# Patient Record
Sex: FEMALE | Race: WHITE | Employment: OTHER | URBAN - METROPOLITAN AREA
[De-identification: names, ages, dates, MRNs, and addresses within clinical notes are randomized per-mention and may not be internally consistent; named-entity substitution may affect disease eponyms.]

---

## 2019-02-19 ENCOUNTER — OFFICE VISIT (OUTPATIENT)
Dept: PRIMARY CARE CLINIC | Age: 64
End: 2019-02-19
Payer: MEDICARE

## 2019-02-19 VITALS
SYSTOLIC BLOOD PRESSURE: 138 MMHG | HEIGHT: 65 IN | HEART RATE: 65 BPM | DIASTOLIC BLOOD PRESSURE: 88 MMHG | TEMPERATURE: 97.7 F | OXYGEN SATURATION: 97 % | BODY MASS INDEX: 38.15 KG/M2 | RESPIRATION RATE: 14 BRPM | WEIGHT: 229 LBS

## 2019-02-19 DIAGNOSIS — M79.604 LEG PAIN, RIGHT: ICD-10-CM

## 2019-02-19 DIAGNOSIS — K27.9 PEPTIC ULCER: Primary | ICD-10-CM

## 2019-02-19 DIAGNOSIS — I26.99 RECURRENT PULMONARY EMBOLI (HCC): ICD-10-CM

## 2019-02-19 PROCEDURE — 1036F TOBACCO NON-USER: CPT | Performed by: INTERNAL MEDICINE

## 2019-02-19 PROCEDURE — G8417 CALC BMI ABV UP PARAM F/U: HCPCS | Performed by: INTERNAL MEDICINE

## 2019-02-19 PROCEDURE — 99203 OFFICE O/P NEW LOW 30 MIN: CPT | Performed by: INTERNAL MEDICINE

## 2019-02-19 PROCEDURE — G8484 FLU IMMUNIZE NO ADMIN: HCPCS | Performed by: INTERNAL MEDICINE

## 2019-02-19 PROCEDURE — G8427 DOCREV CUR MEDS BY ELIG CLIN: HCPCS | Performed by: INTERNAL MEDICINE

## 2019-02-19 PROCEDURE — 3017F COLORECTAL CA SCREEN DOC REV: CPT | Performed by: INTERNAL MEDICINE

## 2019-02-19 RX ORDER — QUINIDINE GLUCONATE 324 MG
TABLET, EXTENDED RELEASE ORAL
COMMUNITY
Start: 2018-12-12 | End: 2019-03-04 | Stop reason: SDUPTHER

## 2019-02-19 RX ORDER — WARFARIN SODIUM 10 MG/1
10 TABLET ORAL
COMMUNITY
Start: 2019-01-10 | End: 2020-03-24 | Stop reason: SDUPTHER

## 2019-02-19 RX ORDER — HYDROCODONE BITARTRATE AND ACETAMINOPHEN 5; 325 MG/1; MG/1
TABLET ORAL
COMMUNITY
Start: 2019-02-04 | End: 2019-02-19 | Stop reason: SDUPTHER

## 2019-02-19 RX ORDER — HYDROCODONE BITARTRATE AND ACETAMINOPHEN 5; 325 MG/1; MG/1
1 TABLET ORAL 3 TIMES DAILY PRN
Qty: 90 TABLET | Refills: 0 | Status: SHIPPED | OUTPATIENT
Start: 2019-02-19 | End: 2019-03-21

## 2019-02-19 RX ORDER — OMEPRAZOLE 40 MG/1
40 CAPSULE, DELAYED RELEASE ORAL DAILY
Qty: 30 CAPSULE | Refills: 11 | Status: SHIPPED | OUTPATIENT
Start: 2019-02-19 | End: 2020-01-28 | Stop reason: SDUPTHER

## 2019-02-19 RX ORDER — OMEPRAZOLE 20 MG/1
20 CAPSULE, DELAYED RELEASE ORAL DAILY
COMMUNITY
End: 2019-02-19 | Stop reason: SDUPTHER

## 2019-02-19 RX ORDER — METOPROLOL SUCCINATE 50 MG/1
50 TABLET, EXTENDED RELEASE ORAL
COMMUNITY
Start: 2019-01-07 | End: 2020-03-24 | Stop reason: SDUPTHER

## 2019-02-19 RX ORDER — PANTOPRAZOLE SODIUM 40 MG/1
TABLET, DELAYED RELEASE ORAL
COMMUNITY
Start: 2018-12-02 | End: 2019-10-01

## 2019-02-19 RX ORDER — SPIRONOLACTONE 25 MG/1
TABLET ORAL
Refills: 0 | COMMUNITY
Start: 2019-01-30 | End: 2019-03-05 | Stop reason: SDUPTHER

## 2019-02-19 ASSESSMENT — PATIENT HEALTH QUESTIONNAIRE - PHQ9
2. FEELING DOWN, DEPRESSED OR HOPELESS: 0
1. LITTLE INTEREST OR PLEASURE IN DOING THINGS: 0
SUM OF ALL RESPONSES TO PHQ9 QUESTIONS 1 & 2: 0
SUM OF ALL RESPONSES TO PHQ QUESTIONS 1-9: 0
SUM OF ALL RESPONSES TO PHQ QUESTIONS 1-9: 0

## 2019-02-24 ASSESSMENT — ENCOUNTER SYMPTOMS
FACIAL SWELLING: 0
ABDOMINAL DISTENTION: 0
BLOOD IN STOOL: 0
FLATUS: 0
PHOTOPHOBIA: 0
APNEA: 0
CHOKING: 0
CONSTIPATION: 0
ABDOMINAL PAIN: 1
BELCHING: 0

## 2019-02-25 DIAGNOSIS — I26.99 RECURRENT PULMONARY EMBOLI (HCC): ICD-10-CM

## 2019-02-25 LAB
INR BLD: 1.9
PROTHROMBIN TIME: 18.8 SEC (ref 9–11.5)

## 2019-03-04 ENCOUNTER — TELEPHONE (OUTPATIENT)
Dept: PRIMARY CARE CLINIC | Age: 64
End: 2019-03-04

## 2019-03-04 RX ORDER — QUINIDINE GLUCONATE 324 MG
240 TABLET, EXTENDED RELEASE ORAL DAILY
Qty: 90 TABLET | Refills: 1 | Status: SHIPPED | OUTPATIENT
Start: 2019-03-04 | End: 2019-08-20 | Stop reason: SDUPTHER

## 2019-03-05 RX ORDER — SPIRONOLACTONE 25 MG/1
25 TABLET ORAL DAILY
Qty: 90 TABLET | Refills: 1 | Status: SHIPPED | OUTPATIENT
Start: 2019-03-05 | End: 2019-08-20 | Stop reason: SDUPTHER

## 2019-03-13 DIAGNOSIS — Z79.01 ENCOUNTER FOR MONITORING COUMADIN THERAPY: ICD-10-CM

## 2019-03-13 DIAGNOSIS — Z51.81 ENCOUNTER FOR MONITORING COUMADIN THERAPY: Primary | ICD-10-CM

## 2019-03-13 DIAGNOSIS — Z51.81 ENCOUNTER FOR MONITORING COUMADIN THERAPY: ICD-10-CM

## 2019-03-13 DIAGNOSIS — Z79.01 ENCOUNTER FOR MONITORING COUMADIN THERAPY: Primary | ICD-10-CM

## 2019-03-13 LAB
INR BLD: 2.1
PROTHROMBIN TIME: 21.2 SEC (ref 9–11.5)

## 2019-03-19 ENCOUNTER — TELEPHONE (OUTPATIENT)
Dept: PRIMARY CARE CLINIC | Age: 64
End: 2019-03-19

## 2019-04-01 DIAGNOSIS — M79.604 LEG PAIN, RIGHT: ICD-10-CM

## 2019-04-01 RX ORDER — HYDROCODONE BITARTRATE AND ACETAMINOPHEN 5; 325 MG/1; MG/1
1 TABLET ORAL 3 TIMES DAILY PRN
Qty: 90 TABLET | Refills: 0 | OUTPATIENT
Start: 2019-04-01 | End: 2019-05-01

## 2019-04-24 ENCOUNTER — ANTI-COAG VISIT (OUTPATIENT)
Dept: FAMILY MEDICINE CLINIC | Age: 64
End: 2019-04-24

## 2019-04-24 DIAGNOSIS — Z79.01 ANTICOAGULANT LONG-TERM USE: ICD-10-CM

## 2019-04-24 DIAGNOSIS — Z79.01 ANTICOAGULANT LONG-TERM USE: Primary | ICD-10-CM

## 2019-04-24 LAB
INR BLD: 1.4
PROTHROMBIN TIME: 14.2 SEC (ref 9–11.5)

## 2019-04-26 ENCOUNTER — TELEPHONE (OUTPATIENT)
Dept: FAMILY MEDICINE CLINIC | Age: 64
End: 2019-04-26

## 2019-05-28 DIAGNOSIS — Z79.01 ANTICOAGULANT LONG-TERM USE: ICD-10-CM

## 2019-05-28 LAB
INR BLD: 1.4
PROTHROMBIN TIME: 13.7 SEC (ref 9–11.5)

## 2019-05-30 ENCOUNTER — ANTI-COAG VISIT (OUTPATIENT)
Dept: FAMILY MEDICINE CLINIC | Age: 64
End: 2019-05-30

## 2019-06-12 DIAGNOSIS — Z79.01 ANTICOAGULANT LONG-TERM USE: ICD-10-CM

## 2019-06-12 LAB
INR BLD: 1.3
PROTHROMBIN TIME: 12.8 SEC (ref 9–11.5)

## 2019-06-26 DIAGNOSIS — Z79.01 ANTICOAGULANT LONG-TERM USE: ICD-10-CM

## 2019-06-26 LAB
INR BLD: 1.3
PROTHROMBIN TIME: 12.7 SEC (ref 9–11.5)

## 2019-07-22 DIAGNOSIS — Z79.01 ANTICOAGULANT LONG-TERM USE: ICD-10-CM

## 2019-07-22 LAB
INR BLD: 1.7
PROTHROMBIN TIME: 20.8 SEC (ref 12.3–14.9)

## 2019-07-26 ENCOUNTER — TELEPHONE (OUTPATIENT)
Dept: FAMILY MEDICINE CLINIC | Age: 64
End: 2019-07-26

## 2019-07-26 NOTE — TELEPHONE ENCOUNTER
Having tooth extraction and her dentist advised her to call to find out how many days she should DC blood thinners before having the surgery.   Please call Fanta Holbrook [phone]

## 2019-08-13 DIAGNOSIS — Z79.01 ANTICOAGULANT LONG-TERM USE: ICD-10-CM

## 2019-08-13 LAB
INR BLD: 1.6
PROTHROMBIN TIME: 19.6 SEC (ref 12.3–14.9)

## 2019-08-20 RX ORDER — SPIRONOLACTONE 25 MG/1
TABLET ORAL
Qty: 30 TABLET | Refills: 5 | Status: SHIPPED | OUTPATIENT
Start: 2019-08-20 | End: 2019-10-01

## 2019-08-26 DIAGNOSIS — Z79.01 ANTICOAGULANT LONG-TERM USE: Primary | ICD-10-CM

## 2019-08-26 DIAGNOSIS — D50.9 IRON DEFICIENCY ANEMIA, UNSPECIFIED IRON DEFICIENCY ANEMIA TYPE: Primary | ICD-10-CM

## 2019-08-26 RX ORDER — QUINIDINE GLUCONATE 324 MG
TABLET, EXTENDED RELEASE ORAL
Qty: 30 TABLET | Refills: 5 | OUTPATIENT
Start: 2019-08-26

## 2019-08-26 RX ORDER — QUINIDINE GLUCONATE 324 MG
240 TABLET, EXTENDED RELEASE ORAL DAILY
Qty: 30 TABLET | Refills: 5 | Status: SHIPPED | OUTPATIENT
Start: 2019-08-26 | End: 2019-09-04 | Stop reason: SDUPTHER

## 2019-08-28 ENCOUNTER — ANTI-COAG VISIT (OUTPATIENT)
Dept: FAMILY MEDICINE CLINIC | Age: 64
End: 2019-08-28

## 2019-08-28 DIAGNOSIS — D50.9 IRON DEFICIENCY ANEMIA, UNSPECIFIED IRON DEFICIENCY ANEMIA TYPE: ICD-10-CM

## 2019-08-28 DIAGNOSIS — Z79.01 ANTICOAGULANT LONG-TERM USE: ICD-10-CM

## 2019-08-28 LAB
ALBUMIN SERPL-MCNC: 4.3 G/DL (ref 3.5–4.6)
ALP BLD-CCNC: 68 U/L (ref 40–130)
ALT SERPL-CCNC: 16 U/L (ref 0–33)
ANION GAP SERPL CALCULATED.3IONS-SCNC: 13 MEQ/L (ref 9–15)
AST SERPL-CCNC: 22 U/L (ref 0–35)
BASOPHILS ABSOLUTE: 0.1 K/UL (ref 0–0.2)
BASOPHILS RELATIVE PERCENT: 1.2 %
BILIRUB SERPL-MCNC: 0.3 MG/DL (ref 0.2–0.7)
BUN BLDV-MCNC: 17 MG/DL (ref 8–23)
CALCIUM SERPL-MCNC: 9.3 MG/DL (ref 8.5–9.9)
CHLORIDE BLD-SCNC: 101 MEQ/L (ref 95–107)
CO2: 26 MEQ/L (ref 20–31)
CREAT SERPL-MCNC: 0.79 MG/DL (ref 0.5–0.9)
EOSINOPHILS ABSOLUTE: 0.2 K/UL (ref 0–0.7)
EOSINOPHILS RELATIVE PERCENT: 4.2 %
FERRITIN: 94.2 NG/ML (ref 13–150)
GFR AFRICAN AMERICAN: >60
GFR NON-AFRICAN AMERICAN: >60
GLOBULIN: 2.9 G/DL (ref 2.3–3.5)
GLUCOSE BLD-MCNC: 95 MG/DL (ref 70–99)
HCT VFR BLD CALC: 37.4 % (ref 37–47)
HEMOGLOBIN: 12.3 G/DL (ref 12–16)
INR BLD: 2
IRON SATURATION: 42 % (ref 11–46)
IRON: 143 UG/DL (ref 37–145)
LYMPHOCYTES ABSOLUTE: 1.6 K/UL (ref 1–4.8)
LYMPHOCYTES RELATIVE PERCENT: 33.9 %
MCH RBC QN AUTO: 32.2 PG (ref 27–31.3)
MCHC RBC AUTO-ENTMCNC: 32.9 % (ref 33–37)
MCV RBC AUTO: 97.7 FL (ref 82–100)
MONOCYTES ABSOLUTE: 0.6 K/UL (ref 0.2–0.8)
MONOCYTES RELATIVE PERCENT: 12.7 %
NEUTROPHILS ABSOLUTE: 2.3 K/UL (ref 1.4–6.5)
NEUTROPHILS RELATIVE PERCENT: 48 %
PDW BLD-RTO: 13.1 % (ref 11.5–14.5)
PLATELET # BLD: 219 K/UL (ref 130–400)
POTASSIUM SERPL-SCNC: 4.5 MEQ/L (ref 3.4–4.9)
PROTHROMBIN TIME: 23.1 SEC (ref 12.3–14.9)
RBC # BLD: 3.83 M/UL (ref 4.2–5.4)
SODIUM BLD-SCNC: 140 MEQ/L (ref 135–144)
TOTAL IRON BINDING CAPACITY: 339 UG/DL (ref 178–450)
TOTAL PROTEIN: 7.2 G/DL (ref 6.3–8)
WBC # BLD: 4.7 K/UL (ref 4.8–10.8)

## 2019-09-04 RX ORDER — QUINIDINE GLUCONATE 324 MG
240 TABLET, EXTENDED RELEASE ORAL DAILY
Qty: 30 TABLET | Refills: 5 | Status: SHIPPED | OUTPATIENT
Start: 2019-09-04 | End: 2021-11-11 | Stop reason: ALTCHOICE

## 2019-09-09 ENCOUNTER — TELEPHONE (OUTPATIENT)
Dept: FAMILY MEDICINE CLINIC | Age: 64
End: 2019-09-09

## 2019-09-10 NOTE — TELEPHONE ENCOUNTER
Pt wants to verify this is pertaining to 8/28 protime? She never heard anything from those results. Should she still check in 1 month or 2 weeks now?

## 2019-10-01 ENCOUNTER — OFFICE VISIT (OUTPATIENT)
Dept: FAMILY MEDICINE CLINIC | Age: 64
End: 2019-10-01
Payer: MEDICARE

## 2019-10-01 VITALS
DIASTOLIC BLOOD PRESSURE: 86 MMHG | SYSTOLIC BLOOD PRESSURE: 138 MMHG | OXYGEN SATURATION: 94 % | TEMPERATURE: 98.1 F | HEIGHT: 65 IN | WEIGHT: 242 LBS | BODY MASS INDEX: 40.32 KG/M2 | RESPIRATION RATE: 14 BRPM | HEART RATE: 86 BPM

## 2019-10-01 DIAGNOSIS — Z00.00 HEALTH CARE MAINTENANCE: ICD-10-CM

## 2019-10-01 DIAGNOSIS — Z00.00 PREVENTATIVE HEALTH CARE: ICD-10-CM

## 2019-10-01 DIAGNOSIS — E78.49 OTHER HYPERLIPIDEMIA: ICD-10-CM

## 2019-10-01 DIAGNOSIS — I10 ESSENTIAL HYPERTENSION: Primary | ICD-10-CM

## 2019-10-01 DIAGNOSIS — N64.4 BREAST PAIN: ICD-10-CM

## 2019-10-01 DIAGNOSIS — D50.9 IRON DEFICIENCY ANEMIA, UNSPECIFIED IRON DEFICIENCY ANEMIA TYPE: ICD-10-CM

## 2019-10-01 DIAGNOSIS — Z11.59 NEED FOR HEPATITIS C SCREENING TEST: ICD-10-CM

## 2019-10-01 DIAGNOSIS — Z12.31 SCREENING MAMMOGRAM, ENCOUNTER FOR: ICD-10-CM

## 2019-10-01 PROCEDURE — G8417 CALC BMI ABV UP PARAM F/U: HCPCS | Performed by: INTERNAL MEDICINE

## 2019-10-01 PROCEDURE — 90688 IIV4 VACCINE SPLT 0.5 ML IM: CPT | Performed by: INTERNAL MEDICINE

## 2019-10-01 PROCEDURE — G8427 DOCREV CUR MEDS BY ELIG CLIN: HCPCS | Performed by: INTERNAL MEDICINE

## 2019-10-01 PROCEDURE — 3017F COLORECTAL CA SCREEN DOC REV: CPT | Performed by: INTERNAL MEDICINE

## 2019-10-01 PROCEDURE — 90471 IMMUNIZATION ADMIN: CPT | Performed by: INTERNAL MEDICINE

## 2019-10-01 PROCEDURE — 99213 OFFICE O/P EST LOW 20 MIN: CPT | Performed by: INTERNAL MEDICINE

## 2019-10-01 PROCEDURE — 1036F TOBACCO NON-USER: CPT | Performed by: INTERNAL MEDICINE

## 2019-10-01 PROCEDURE — G8482 FLU IMMUNIZE ORDER/ADMIN: HCPCS | Performed by: INTERNAL MEDICINE

## 2019-10-01 ASSESSMENT — ENCOUNTER SYMPTOMS: BACK PAIN: 1

## 2019-10-03 DIAGNOSIS — Z79.01 ANTICOAGULANT LONG-TERM USE: ICD-10-CM

## 2019-10-03 DIAGNOSIS — Z11.59 NEED FOR HEPATITIS C SCREENING TEST: ICD-10-CM

## 2019-10-03 DIAGNOSIS — E78.49 OTHER HYPERLIPIDEMIA: ICD-10-CM

## 2019-10-03 LAB
CHOLESTEROL, TOTAL: 223 MG/DL (ref 0–199)
HDLC SERPL-MCNC: 51 MG/DL (ref 40–59)
HEPATITIS C ANTIBODY INTERPRETATION: NORMAL
INR BLD: 2.6
LDL CHOLESTEROL CALCULATED: 159 MG/DL (ref 0–129)
PROTHROMBIN TIME: 29.2 SEC (ref 12.3–14.9)
TRIGL SERPL-MCNC: 66 MG/DL (ref 0–150)

## 2019-10-04 ENCOUNTER — HOSPITAL ENCOUNTER (OUTPATIENT)
Dept: ULTRASOUND IMAGING | Age: 64
Discharge: HOME | End: 2019-10-06
Payer: MEDICARE

## 2019-10-04 ENCOUNTER — HOSPITAL ENCOUNTER (OUTPATIENT)
Dept: WOMENS IMAGING | Age: 64
Discharge: HOME | End: 2019-10-06
Payer: MEDICARE

## 2019-10-04 DIAGNOSIS — N64.4 BREAST PAIN: ICD-10-CM

## 2019-10-04 DIAGNOSIS — Z12.31 SCREENING MAMMOGRAM, ENCOUNTER FOR: ICD-10-CM

## 2019-10-04 PROCEDURE — G0279 TOMOSYNTHESIS, MAMMO: HCPCS

## 2019-10-04 PROCEDURE — 76642 ULTRASOUND BREAST LIMITED: CPT

## 2019-10-07 ENCOUNTER — ANTI-COAG VISIT (OUTPATIENT)
Dept: FAMILY MEDICINE CLINIC | Age: 64
End: 2019-10-07

## 2019-10-09 DIAGNOSIS — E78.00 PURE HYPERCHOLESTEROLEMIA: Primary | ICD-10-CM

## 2019-10-13 ASSESSMENT — ENCOUNTER SYMPTOMS
BLOOD IN STOOL: 0
ABDOMINAL DISTENTION: 0
PHOTOPHOBIA: 0
FACIAL SWELLING: 0
APNEA: 0
CHOKING: 0

## 2019-11-08 DIAGNOSIS — Z79.01 ANTICOAGULANT LONG-TERM USE: ICD-10-CM

## 2019-11-08 LAB
INR BLD: 2.1
PROTHROMBIN TIME: 24.6 SEC (ref 12.3–14.9)

## 2019-11-11 ENCOUNTER — ANTI-COAG VISIT (OUTPATIENT)
Dept: FAMILY MEDICINE CLINIC | Age: 64
End: 2019-11-11

## 2019-12-11 DIAGNOSIS — Z79.01 ANTICOAGULANT LONG-TERM USE: ICD-10-CM

## 2019-12-11 LAB
INR BLD: 3
PROTHROMBIN TIME: 32.7 SEC (ref 12.3–14.9)

## 2019-12-12 ENCOUNTER — ANTI-COAG VISIT (OUTPATIENT)
Dept: FAMILY MEDICINE CLINIC | Age: 64
End: 2019-12-12

## 2020-01-08 DIAGNOSIS — Z79.01 ANTICOAGULANT LONG-TERM USE: ICD-10-CM

## 2020-01-08 LAB
INR BLD: 2.2
PROTHROMBIN TIME: 25.4 SEC (ref 12.3–14.9)

## 2020-01-10 ENCOUNTER — TELEPHONE (OUTPATIENT)
Dept: PRIMARY CARE CLINIC | Age: 65
End: 2020-01-10

## 2020-01-28 RX ORDER — OMEPRAZOLE 40 MG/1
40 CAPSULE, DELAYED RELEASE ORAL DAILY
Qty: 30 CAPSULE | Refills: 11 | Status: SHIPPED | OUTPATIENT
Start: 2020-01-28 | End: 2021-04-05 | Stop reason: SDUPTHER

## 2020-02-12 DIAGNOSIS — Z79.01 ANTICOAGULANT LONG-TERM USE: ICD-10-CM

## 2020-02-12 LAB
INR BLD: 2.2
PROTHROMBIN TIME: 25.1 SEC (ref 12.3–14.9)

## 2020-02-13 ENCOUNTER — ANTI-COAG VISIT (OUTPATIENT)
Dept: FAMILY MEDICINE CLINIC | Age: 65
End: 2020-02-13

## 2020-02-21 RX ORDER — SPIRONOLACTONE 25 MG/1
TABLET ORAL
Qty: 30 TABLET | Refills: 5 | Status: SHIPPED | OUTPATIENT
Start: 2020-02-21 | End: 2020-08-10

## 2020-03-09 DIAGNOSIS — Z79.01 ANTICOAGULANT LONG-TERM USE: ICD-10-CM

## 2020-03-09 LAB
INR BLD: 2.6
PROTHROMBIN TIME: 28.6 SEC (ref 12.3–14.9)

## 2020-03-10 ENCOUNTER — ANTI-COAG VISIT (OUTPATIENT)
Dept: FAMILY MEDICINE CLINIC | Age: 65
End: 2020-03-10

## 2020-03-11 NOTE — PROGRESS NOTES
Surgery April 16th, Hiatal hernia repaired. Off coumadin 5 days before. Will have it checked a week before surgery and wont know what it will be like after surgery.  Also needs new order written

## 2020-03-24 ENCOUNTER — TELEPHONE (OUTPATIENT)
Dept: PRIMARY CARE CLINIC | Age: 65
End: 2020-03-24

## 2020-03-24 RX ORDER — METOPROLOL SUCCINATE 50 MG/1
50 TABLET, EXTENDED RELEASE ORAL DAILY
Qty: 30 TABLET | Refills: 2 | Status: SHIPPED | OUTPATIENT
Start: 2020-03-24 | End: 2020-06-14

## 2020-03-24 RX ORDER — WARFARIN SODIUM 10 MG/1
10 TABLET ORAL DAILY
Qty: 30 TABLET | Refills: 2 | Status: SHIPPED | OUTPATIENT
Start: 2020-03-24 | End: 2020-06-14

## 2020-03-24 NOTE — TELEPHONE ENCOUNTER
On the warfarin pt has been taking 7.5 mgs daily. Please renew her standing order for pt/inr, she will need when she has drawn at the hospital, they told her last time was the last time she had on her previous standing order. Pt due in 2 weeks please have the order in for that visit. Pts surgery postponed for atleast 6 weeks.

## 2020-05-09 DIAGNOSIS — Z79.01 ANTICOAGULANT LONG-TERM USE: ICD-10-CM

## 2020-05-09 DIAGNOSIS — I26.99 PULMONARY EMBOLISM, OTHER, UNSPECIFIED CHRONICITY, UNSPECIFIED WHETHER ACUTE COR PULMONALE PRESENT (HCC): ICD-10-CM

## 2020-05-09 DIAGNOSIS — I82.403 RECURRENT DEEP VEIN THROMBOSIS (DVT) OF BOTH LOWER EXTREMITIES (HCC): ICD-10-CM

## 2020-05-09 LAB
INR BLD: 2.3
PROTHROMBIN TIME: 25.1 SEC (ref 12.3–14.9)

## 2020-06-04 ENCOUNTER — TELEPHONE (OUTPATIENT)
Dept: PRIMARY CARE CLINIC | Age: 65
End: 2020-06-04

## 2020-06-04 DIAGNOSIS — I82.403 RECURRENT DEEP VEIN THROMBOSIS (DVT) OF BOTH LOWER EXTREMITIES (HCC): ICD-10-CM

## 2020-06-04 DIAGNOSIS — I26.99 PULMONARY EMBOLISM, OTHER, UNSPECIFIED CHRONICITY, UNSPECIFIED WHETHER ACUTE COR PULMONALE PRESENT (HCC): ICD-10-CM

## 2020-06-04 DIAGNOSIS — Z79.01 ANTICOAGULANT LONG-TERM USE: ICD-10-CM

## 2020-06-04 LAB
INR BLD: 2.1
PROTHROMBIN TIME: 23.1 SEC (ref 12.3–14.9)

## 2020-06-14 RX ORDER — WARFARIN SODIUM 10 MG/1
TABLET ORAL
Qty: 30 TABLET | Refills: 2 | Status: SHIPPED | OUTPATIENT
Start: 2020-06-14 | End: 2020-09-19

## 2020-06-14 RX ORDER — METOPROLOL SUCCINATE 50 MG/1
TABLET, EXTENDED RELEASE ORAL
Qty: 30 TABLET | Refills: 2 | Status: SHIPPED | OUTPATIENT
Start: 2020-06-14 | End: 2020-09-08

## 2020-06-17 ENCOUNTER — TELEPHONE (OUTPATIENT)
Dept: PRIMARY CARE CLINIC | Age: 65
End: 2020-06-17

## 2020-07-15 ENCOUNTER — TELEPHONE (OUTPATIENT)
Dept: PRIMARY CARE CLINIC | Age: 65
End: 2020-07-15

## 2020-07-15 NOTE — TELEPHONE ENCOUNTER
She had hernia surgery and had to be off her coumadin. She just started back on the coumadin on 7/11/20 and wants to know if she should have her coumadin checked before her appointment with you next Thursday? Her # is [phone].

## 2020-07-23 ENCOUNTER — OFFICE VISIT (OUTPATIENT)
Dept: PRIMARY CARE CLINIC | Age: 65
End: 2020-07-23
Payer: MEDICARE

## 2020-07-23 VITALS
OXYGEN SATURATION: 99 % | HEART RATE: 76 BPM | BODY MASS INDEX: 35.26 KG/M2 | DIASTOLIC BLOOD PRESSURE: 80 MMHG | SYSTOLIC BLOOD PRESSURE: 122 MMHG | WEIGHT: 199 LBS | HEIGHT: 63 IN | TEMPERATURE: 97.6 F | RESPIRATION RATE: 14 BRPM

## 2020-07-23 PROCEDURE — G8427 DOCREV CUR MEDS BY ELIG CLIN: HCPCS | Performed by: INTERNAL MEDICINE

## 2020-07-23 PROCEDURE — 99213 OFFICE O/P EST LOW 20 MIN: CPT | Performed by: INTERNAL MEDICINE

## 2020-07-23 PROCEDURE — G8417 CALC BMI ABV UP PARAM F/U: HCPCS | Performed by: INTERNAL MEDICINE

## 2020-07-23 PROCEDURE — 1036F TOBACCO NON-USER: CPT | Performed by: INTERNAL MEDICINE

## 2020-07-23 PROCEDURE — 3017F COLORECTAL CA SCREEN DOC REV: CPT | Performed by: INTERNAL MEDICINE

## 2020-07-23 SDOH — ECONOMIC STABILITY: FOOD INSECURITY: WITHIN THE PAST 12 MONTHS, YOU WORRIED THAT YOUR FOOD WOULD RUN OUT BEFORE YOU GOT MONEY TO BUY MORE.: NEVER TRUE

## 2020-07-23 SDOH — ECONOMIC STABILITY: FOOD INSECURITY: WITHIN THE PAST 12 MONTHS, THE FOOD YOU BOUGHT JUST DIDN'T LAST AND YOU DIDN'T HAVE MONEY TO GET MORE.: NEVER TRUE

## 2020-07-23 SDOH — ECONOMIC STABILITY: INCOME INSECURITY: HOW HARD IS IT FOR YOU TO PAY FOR THE VERY BASICS LIKE FOOD, HOUSING, MEDICAL CARE, AND HEATING?: NOT HARD AT ALL

## 2020-07-23 ASSESSMENT — PATIENT HEALTH QUESTIONNAIRE - PHQ9
2. FEELING DOWN, DEPRESSED OR HOPELESS: 0
1. LITTLE INTEREST OR PLEASURE IN DOING THINGS: 0
SUM OF ALL RESPONSES TO PHQ QUESTIONS 1-9: 0
SUM OF ALL RESPONSES TO PHQ QUESTIONS 1-9: 0
SUM OF ALL RESPONSES TO PHQ9 QUESTIONS 1 & 2: 0

## 2020-07-23 NOTE — PROGRESS NOTES
Renny Degree 63 y.o. female presents today with   Chief Complaint   Patient presents with    2 Week Follow-Up     follow up after hernia surgery        HPI hiatal hernia surgery. Still on protonix      Past Medical History:   Diagnosis Date    Chronic pain of right ankle 2016    twisting fall, arthritis    DVT of deep femoral vein (HCC)     x 2, after surgery knee, other spontanous     Hiatal hernia     History of radiofrequency ablation (RFA) procedure for cardiac arrhythmia     SVT    Hx of blood clots     Hypertension     Leg pain, right     ulcers    Low back pain radiating to right leg     Osteoarthritis     Peptic ulcer 2011    cause of anemia    Pulmonary emboli ([address] 2001    x 2, right leg, left leg    Venous ulcer of right leg (HCC)     chronic pain     There are no active problems to display for this patient.     Past Surgical History:   Procedure Laterality Date     SECTION      JOINT REPLACEMENT Bilateral      Family History   Problem Relation Age of Onset    Stroke Mother     Breast Cancer Mother     High Blood Pressure Mother     Stroke Father     High Blood Pressure Father      Social History     Socioeconomic History    Marital status:      Spouse name: None    Number of children: None    Years of education: None    Highest education level: None   Occupational History    None   Social Needs    Financial resource strain: Not hard at all   Nathan-Jonh insecurity     Worry: Never true     Inability: Never true   [employer] needs     Medical: None     Non-medical: None   Tobacco Use    Smoking status: Never Smoker    Smokeless tobacco: Never Used   Substance and Sexual Activity    Alcohol use: No    Drug use: No    Sexual activity: None   Lifestyle    Physical activity     Days per week: None     Minutes per session: None    Stress: None   Relationships    Social connections     Talks on phone: None     Gets together: None     Attends Jehovah's witness service: None     Active member of club or organization: None     Attends meetings of clubs or organizations: None     Relationship status: None    Intimate partner violence     Fear of current or ex partner: None     Emotionally abused: None     Physically abused: None     Forced sexual activity: None   Other Topics Concern    None   Social History Narrative    None     No Known Allergies    Review of Systems   Constitutional: Negative for chills and fever. HENT: Negative for facial swelling and nosebleeds. Eyes: Negative for photophobia and visual disturbance. Respiratory: Negative for apnea, cough and choking. Cardiovascular: Negative for chest pain and palpitations. Gastrointestinal: Negative for abdominal distention, blood in stool, constipation and diarrhea. Genitourinary: Negative for enuresis, hematuria and vaginal bleeding. Musculoskeletal: Negative for gait problem and joint swelling. Skin: Negative for rash. Neurological: Negative for syncope and speech difficulty. Hematological: Does not bruise/bleed easily. Psychiatric/Behavioral: Negative for hallucinations and suicidal ideas. Vitals:    07/23/20 1040   BP: 122/80   Pulse: 76   Resp: 14   Temp: 97.6 °F (36.4 °C)   SpO2: 99%   Weight: 199 lb (90.3 kg)   Height: 5' 2.6\" (1.59 m)       Physical Exam  Constitutional:       Appearance: She is well-developed. HENT:      Head: Normocephalic and atraumatic. Mouth/Throat:      Mouth: Mucous membranes are moist.   Eyes:      Pupils: Pupils are equal, round, and reactive to light. Neck:      Musculoskeletal: Normal range of motion and neck supple. Cardiovascular:      Rate and Rhythm: Normal rate and regular rhythm. Heart sounds: Normal heart sounds. Pulmonary:      Effort: No respiratory distress. Breath sounds: Normal breath sounds. No stridor. No wheezing or rales. Abdominal:      General: Bowel sounds are normal. There is no distension. Musculoskeletal: Normal range of motion. Skin:     Coloration: Skin is not jaundiced. Neurological:      Mental Status: She is alert and oriented to person, place, and time. Cranial Nerves: No cranial nerve deficit. Psychiatric:         Mood and Affect: Mood normal.     Assessment/Plan  Brannon Devries was seen today for 2 week follow-up. Diagnoses and all orders for this visit:    Hiatal hernia  -     Comprehensive Metabolic Panel; Future  -     CBC With Auto Differential; Future  -     Iron And Tibc; Future    Preventative health care  -     Comprehensive Metabolic Panel; Future  -     CBC With Auto Differential; Future  -     Hemoglobin A1C; Future  -     Lipid Panel; Future    Hyperglycemia  -     Hemoglobin A1C; Future    Pure hypercholesterolemia  -     Comprehensive Metabolic Panel; Future  -     Lipid Panel; Future        No follow-ups on file.     Av Dominguez MD

## 2020-07-26 ASSESSMENT — ENCOUNTER SYMPTOMS
ABDOMINAL DISTENTION: 0
COUGH: 0
FACIAL SWELLING: 0
BLOOD IN STOOL: 0
CONSTIPATION: 0
APNEA: 0
CHOKING: 0
PHOTOPHOBIA: 0
DIARRHEA: 0

## 2020-07-27 DIAGNOSIS — K44.9 HIATAL HERNIA: ICD-10-CM

## 2020-07-27 DIAGNOSIS — Z00.00 PREVENTATIVE HEALTH CARE: ICD-10-CM

## 2020-07-27 DIAGNOSIS — E78.00 PURE HYPERCHOLESTEROLEMIA: ICD-10-CM

## 2020-07-27 DIAGNOSIS — I26.99 PULMONARY EMBOLISM, OTHER, UNSPECIFIED CHRONICITY, UNSPECIFIED WHETHER ACUTE COR PULMONALE PRESENT (HCC): ICD-10-CM

## 2020-07-27 DIAGNOSIS — R73.9 HYPERGLYCEMIA: ICD-10-CM

## 2020-07-27 DIAGNOSIS — Z79.01 ANTICOAGULANT LONG-TERM USE: ICD-10-CM

## 2020-07-27 DIAGNOSIS — I82.403 RECURRENT DEEP VEIN THROMBOSIS (DVT) OF BOTH LOWER EXTREMITIES (HCC): ICD-10-CM

## 2020-07-27 LAB
ALBUMIN SERPL-MCNC: 4 G/DL (ref 3.5–4.6)
ALP BLD-CCNC: 49 U/L (ref 40–130)
ALT SERPL-CCNC: <5 U/L (ref 0–33)
ANION GAP SERPL CALCULATED.3IONS-SCNC: 14 MEQ/L (ref 9–15)
AST SERPL-CCNC: <5 U/L (ref 0–35)
BASOPHILS ABSOLUTE: 0 K/UL (ref 0–0.2)
BASOPHILS RELATIVE PERCENT: 1 %
BILIRUB SERPL-MCNC: 0.3 MG/DL (ref 0.2–0.7)
BUN BLDV-MCNC: 10 MG/DL (ref 8–23)
CALCIUM SERPL-MCNC: 9.3 MG/DL (ref 8.5–9.9)
CHLORIDE BLD-SCNC: 102 MEQ/L (ref 95–107)
CHOLESTEROL, TOTAL: 222 MG/DL (ref 0–199)
CO2: 26 MEQ/L (ref 20–31)
CREAT SERPL-MCNC: 0.84 MG/DL (ref 0.5–0.9)
EOSINOPHILS ABSOLUTE: 0.1 K/UL (ref 0–0.7)
EOSINOPHILS RELATIVE PERCENT: 3.2 %
GFR AFRICAN AMERICAN: >60
GFR NON-AFRICAN AMERICAN: >60
GLOBULIN: 2.7 G/DL (ref 2.3–3.5)
GLUCOSE BLD-MCNC: 77 MG/DL (ref 70–99)
HBA1C MFR BLD: 5.3 % (ref 4.8–5.9)
HCT VFR BLD CALC: 37.9 % (ref 37–47)
HDLC SERPL-MCNC: 33 MG/DL (ref 40–59)
HEMOGLOBIN: 12.6 G/DL (ref 12–16)
INR BLD: 6.1
IRON SATURATION: 38 % (ref 11–46)
IRON: 102 UG/DL (ref 37–145)
LDL CHOLESTEROL CALCULATED: 156 MG/DL (ref 0–129)
LYMPHOCYTES ABSOLUTE: 1.4 K/UL (ref 1–4.8)
LYMPHOCYTES RELATIVE PERCENT: 33.9 %
MCH RBC QN AUTO: 32.8 PG (ref 27–31.3)
MCHC RBC AUTO-ENTMCNC: 33.2 % (ref 33–37)
MCV RBC AUTO: 98.7 FL (ref 82–100)
MONOCYTES ABSOLUTE: 0.6 K/UL (ref 0.2–0.8)
MONOCYTES RELATIVE PERCENT: 14.5 %
NEUTROPHILS ABSOLUTE: 1.9 K/UL (ref 1.4–6.5)
NEUTROPHILS RELATIVE PERCENT: 47.4 %
PDW BLD-RTO: 12.9 % (ref 11.5–14.5)
PLATELET # BLD: 333 K/UL (ref 130–400)
POTASSIUM SERPL-SCNC: 4.8 MEQ/L (ref 3.4–4.9)
PROTHROMBIN TIME: 53.3 SEC (ref 12.3–14.9)
RBC # BLD: 3.84 M/UL (ref 4.2–5.4)
SODIUM BLD-SCNC: 142 MEQ/L (ref 135–144)
TOTAL IRON BINDING CAPACITY: 272 UG/DL (ref 178–450)
TOTAL PROTEIN: 6.7 G/DL (ref 6.3–8)
TRIGL SERPL-MCNC: 167 MG/DL (ref 0–150)
WBC # BLD: 4.1 K/UL (ref 4.8–10.8)

## 2020-07-28 ENCOUNTER — TELEPHONE (OUTPATIENT)
Dept: PRIMARY CARE CLINIC | Age: 65
End: 2020-07-28

## 2020-08-03 DIAGNOSIS — I26.99 PULMONARY EMBOLISM, OTHER, UNSPECIFIED CHRONICITY, UNSPECIFIED WHETHER ACUTE COR PULMONALE PRESENT (HCC): ICD-10-CM

## 2020-08-03 DIAGNOSIS — I82.403 RECURRENT DEEP VEIN THROMBOSIS (DVT) OF BOTH LOWER EXTREMITIES (HCC): ICD-10-CM

## 2020-08-03 DIAGNOSIS — Z79.01 ANTICOAGULANT LONG-TERM USE: ICD-10-CM

## 2020-08-03 LAB
INR BLD: 5.7
PROTHROMBIN TIME: 50.8 SEC (ref 12.3–14.9)

## 2020-08-03 NOTE — TELEPHONE ENCOUNTER
She is taking 7.5 mg of coumadin a day. She wants to try the diet first before taking medication. She wants to know how long she should do the diet before rechecking. She is doing the soft diet for 2 months because she had surgery.

## 2020-08-03 NOTE — TELEPHONE ENCOUNTER
Patient aware and states that she will have it done as soon as possible. Pt would like to know if she can do medi weight loss. pls call pt on [phone].  Thanks

## 2020-08-10 DIAGNOSIS — I26.99 PULMONARY EMBOLISM, OTHER, UNSPECIFIED CHRONICITY, UNSPECIFIED WHETHER ACUTE COR PULMONALE PRESENT (HCC): ICD-10-CM

## 2020-08-10 DIAGNOSIS — I82.403 RECURRENT DEEP VEIN THROMBOSIS (DVT) OF BOTH LOWER EXTREMITIES (HCC): ICD-10-CM

## 2020-08-10 DIAGNOSIS — Z79.01 ANTICOAGULANT LONG-TERM USE: ICD-10-CM

## 2020-08-10 LAB
INR BLD: 2.8
PROTHROMBIN TIME: 29.2 SEC (ref 12.3–14.9)

## 2020-08-10 RX ORDER — SPIRONOLACTONE 25 MG/1
TABLET ORAL
Qty: 30 TABLET | Refills: 5 | Status: SHIPPED | OUTPATIENT
Start: 2020-08-10 | End: 2021-01-04

## 2020-08-11 ENCOUNTER — TELEPHONE (OUTPATIENT)
Dept: PRIMARY CARE CLINIC | Age: 65
End: 2020-08-11

## 2020-08-12 ENCOUNTER — TELEPHONE (OUTPATIENT)
Dept: PRIMARY CARE CLINIC | Age: 65
End: 2020-08-12

## 2020-08-12 NOTE — TELEPHONE ENCOUNTER
Cortneye Jerrell  But bad cholesterol is high  Diet  Consider pills   inr from yesterday is good.  Same dose and repeat inr in 1 month

## 2020-08-13 ENCOUNTER — TELEPHONE (OUTPATIENT)
Dept: PRIMARY CARE CLINIC | Age: 65
End: 2020-08-13

## 2020-08-13 NOTE — TELEPHONE ENCOUNTER
Patient called wanting to discuss her protime-INR results and wasn't sure if her numbers were good or bad.  please advise

## 2020-08-13 NOTE — TELEPHONE ENCOUNTER
1st attempt to reach patient.  Called patient @ [phone] and left message on machine for patient to return call during normal business hours of 8:30 AM and 5 PM @ [phone] option 2

## 2020-08-14 RX ORDER — ATORVASTATIN CALCIUM 10 MG/1
10 TABLET, FILM COATED ORAL DAILY
Qty: 30 TABLET | Refills: 5 | Status: SHIPPED | OUTPATIENT
Start: 2020-08-14 | End: 2021-01-04

## 2020-08-14 NOTE — TELEPHONE ENCOUNTER
1st attempt to reach patient. Called patient @ [phone]  And made her aware on her VM also told patient to return call at our office at [phone] if she had any questions.

## 2020-09-08 RX ORDER — METOPROLOL SUCCINATE 50 MG/1
TABLET, EXTENDED RELEASE ORAL
Qty: 30 TABLET | Refills: 2 | Status: SHIPPED | OUTPATIENT
Start: 2020-09-08 | End: 2020-10-07

## 2020-09-08 NOTE — TELEPHONE ENCOUNTER
Pharmacy requesting medication refill.  Please approve or deny this request.    Rx requested:  Requested Prescriptions     Pending Prescriptions Disp Refills    metoprolol succinate (TOPROL XL) 50 MG extended release tablet [Pharmacy Med Name: METOPROLOL SUCC ER 50 MG TAB] 30 tablet 2     Sig: TAKE 1 TABLET BY MOUTH EVERY DAY         Last Office Visit:   7/23/2020      Next Visit Date:  Future Appointments   Date Time Provider Nannette Rebollar   1/25/2021  9:00 AM MD Nannette Almonte

## 2020-09-10 ENCOUNTER — TELEPHONE (OUTPATIENT)
Dept: PRIMARY CARE CLINIC | Age: 65
End: 2020-09-10

## 2020-09-10 NOTE — TELEPHONE ENCOUNTER
She is going to the [facility] for an upper Gi after having surgery for hiatal hernia on 7/7/20 and she is asking if she should stop her warfarin and for how many days? Her # is [phone]. Message can be left on voicemail per patient. She goes tomorrow for protime check.

## 2020-09-11 ENCOUNTER — TELEPHONE (OUTPATIENT)
Dept: PRIMARY CARE CLINIC | Age: 65
End: 2020-09-11

## 2020-09-11 DIAGNOSIS — I82.403 RECURRENT DEEP VEIN THROMBOSIS (DVT) OF BOTH LOWER EXTREMITIES (HCC): ICD-10-CM

## 2020-09-11 DIAGNOSIS — I26.99 PULMONARY EMBOLISM, OTHER, UNSPECIFIED CHRONICITY, UNSPECIFIED WHETHER ACUTE COR PULMONALE PRESENT (HCC): ICD-10-CM

## 2020-09-11 DIAGNOSIS — Z79.01 ANTICOAGULANT LONG-TERM USE: ICD-10-CM

## 2020-09-11 LAB
INR BLD: 3.8
PROTHROMBIN TIME: 37.3 SEC (ref 12.3–14.9)

## 2020-09-19 RX ORDER — WARFARIN SODIUM 10 MG/1
TABLET ORAL
Qty: 30 TABLET | Refills: 2 | Status: SHIPPED | OUTPATIENT
Start: 2020-09-19 | End: 2020-10-14

## 2020-09-19 NOTE — TELEPHONE ENCOUNTER
Called patient on colorectal list     Pt accepts, would like order but not to be scheduled until late November/ early December,.     lov 7/23/2020    Follow up 1/25/21

## 2020-10-07 ENCOUNTER — NURSE ONLY (OUTPATIENT)
Dept: PRIMARY CARE CLINIC | Age: 65
End: 2020-10-07
Payer: MEDICARE

## 2020-10-07 DIAGNOSIS — Z79.01 ANTICOAGULANT LONG-TERM USE: ICD-10-CM

## 2020-10-07 DIAGNOSIS — I26.99 PULMONARY EMBOLISM, OTHER, UNSPECIFIED CHRONICITY, UNSPECIFIED WHETHER ACUTE COR PULMONALE PRESENT (HCC): ICD-10-CM

## 2020-10-07 DIAGNOSIS — I82.403 RECURRENT DEEP VEIN THROMBOSIS (DVT) OF BOTH LOWER EXTREMITIES (HCC): ICD-10-CM

## 2020-10-07 LAB
INR BLD: 1.5
PROTHROMBIN TIME: 17.9 SEC (ref 12.3–14.9)

## 2020-10-07 PROCEDURE — 90688 IIV4 VACCINE SPLT 0.5 ML IM: CPT | Performed by: INTERNAL MEDICINE

## 2020-10-07 PROCEDURE — G0008 ADMIN INFLUENZA VIRUS VAC: HCPCS | Performed by: INTERNAL MEDICINE

## 2020-10-07 RX ORDER — METOPROLOL SUCCINATE 50 MG/1
TABLET, EXTENDED RELEASE ORAL
Qty: 30 TABLET | Refills: 2 | Status: SHIPPED | OUTPATIENT
Start: 2020-10-07 | End: 2021-01-04

## 2020-10-07 NOTE — PROGRESS NOTES
Vaccine Information Sheet, \"Influenza - Inactivated\"  given to Kole Chambers, or parent/legal guardian of  Kole Chambers and verbalized understanding. Patient responses:    Have you ever had a reaction to a flu vaccine? No  Are you able to eat eggs without adverse effects? Yes  Do you have any current illness? No  Have you ever had Guillian [place]ber[place] Syndrome? No    Flu vaccine given per order. Please see immunization tab.

## 2020-10-07 NOTE — TELEPHONE ENCOUNTER
Pharmacy requesting medication refill.  Please approve or deny this request.    Rx requested:  Requested Prescriptions     Pending Prescriptions Disp Refills    metoprolol succinate (TOPROL XL) 50 MG extended release tablet [Pharmacy Med Name: METOPROLOL SUCC ER 50 MG TAB] 30 tablet 2     Sig: TAKE 1 TABLET BY MOUTH EVERY DAY         Last Office Visit:   7/23/2020      Next Visit Date:  Future Appointments   Date Time Provider Nannette Rebollar   1/25/2021  9:00 AM MD Nannette Feng

## 2020-10-08 ENCOUNTER — TELEPHONE (OUTPATIENT)
Dept: PRIMARY CARE CLINIC | Age: 65
End: 2020-10-08

## 2020-10-09 NOTE — TELEPHONE ENCOUNTER
1st attempt to reach patient. Called patient @ [phone] and left message on machine for patient to return call during normal business hours of 8:30 AM and 5 PM @ [phone] option 2.

## 2020-10-14 RX ORDER — WARFARIN SODIUM 10 MG/1
TABLET ORAL
Qty: 30 TABLET | Refills: 2 | Status: SHIPPED | OUTPATIENT
Start: 2020-10-14 | End: 2021-01-18

## 2020-10-14 NOTE — TELEPHONE ENCOUNTER
Pharmacy requesting medication refill.  Please approve or deny this request.    Rx requested:  Requested Prescriptions     Pending Prescriptions Disp Refills    warfarin (COUMADIN) 10 MG tablet [Pharmacy Med Name: WARFARIN SODIUM 10 MG TABLET] 30 tablet 2     Sig: TAKE 1 TABLET BY MOUTH EVERY DAY         Last Office Visit:   7/23/2020      Next Visit Date:  Future Appointments   Date Time Provider Nannette Rebollar   1/25/2021  9:00 AM MD Nannette Wong

## 2020-10-22 DIAGNOSIS — I82.403 RECURRENT DEEP VEIN THROMBOSIS (DVT) OF BOTH LOWER EXTREMITIES (HCC): ICD-10-CM

## 2020-10-22 DIAGNOSIS — I26.99 PULMONARY EMBOLISM, OTHER, UNSPECIFIED CHRONICITY, UNSPECIFIED WHETHER ACUTE COR PULMONALE PRESENT (HCC): ICD-10-CM

## 2020-10-22 DIAGNOSIS — Z79.01 ANTICOAGULANT LONG-TERM USE: ICD-10-CM

## 2020-10-22 LAB
INR BLD: 1.8
PROTHROMBIN TIME: 20.7 SEC (ref 12.3–14.9)

## 2020-10-27 ENCOUNTER — TELEPHONE (OUTPATIENT)
Dept: PRIMARY CARE CLINIC | Age: 65
End: 2020-10-27

## 2020-11-23 DIAGNOSIS — I82.403 RECURRENT DEEP VEIN THROMBOSIS (DVT) OF BOTH LOWER EXTREMITIES (HCC): ICD-10-CM

## 2020-11-23 DIAGNOSIS — I26.99 PULMONARY EMBOLISM, OTHER, UNSPECIFIED CHRONICITY, UNSPECIFIED WHETHER ACUTE COR PULMONALE PRESENT (HCC): ICD-10-CM

## 2020-11-23 DIAGNOSIS — Z79.01 ANTICOAGULANT LONG-TERM USE: ICD-10-CM

## 2020-11-23 LAB
INR BLD: 1.3
PROTHROMBIN TIME: 16.4 SEC (ref 12.3–14.9)

## 2020-12-21 DIAGNOSIS — I82.403 RECURRENT DEEP VEIN THROMBOSIS (DVT) OF BOTH LOWER EXTREMITIES (HCC): ICD-10-CM

## 2020-12-21 DIAGNOSIS — Z79.01 ANTICOAGULANT LONG-TERM USE: ICD-10-CM

## 2020-12-21 DIAGNOSIS — I26.99 PULMONARY EMBOLISM, OTHER, UNSPECIFIED CHRONICITY, UNSPECIFIED WHETHER ACUTE COR PULMONALE PRESENT (HCC): ICD-10-CM

## 2020-12-21 LAB
INR BLD: 1.4
PROTHROMBIN TIME: 17.1 SEC (ref 12.3–14.9)

## 2021-01-03 DIAGNOSIS — E78.00 PURE HYPERCHOLESTEROLEMIA: ICD-10-CM

## 2021-01-04 RX ORDER — SPIRONOLACTONE 25 MG/1
TABLET ORAL
Qty: 90 TABLET | Refills: 1 | Status: SHIPPED | OUTPATIENT
Start: 2021-01-04 | End: 2021-04-05 | Stop reason: SDUPTHER

## 2021-01-04 RX ORDER — ATORVASTATIN CALCIUM 10 MG/1
TABLET, FILM COATED ORAL
Qty: 90 TABLET | Refills: 1 | Status: SHIPPED | OUTPATIENT
Start: 2021-01-04 | End: 2021-04-05 | Stop reason: SDUPTHER

## 2021-01-04 RX ORDER — METOPROLOL SUCCINATE 50 MG/1
TABLET, EXTENDED RELEASE ORAL
Qty: 90 TABLET | Refills: 0 | Status: SHIPPED | OUTPATIENT
Start: 2021-01-04 | End: 2021-04-05 | Stop reason: SDUPTHER

## 2021-01-04 NOTE — TELEPHONE ENCOUNTER
Pharmacy requesting medication refill.  Please approve or deny this request.    Rx requested:  Requested Prescriptions     Pending Prescriptions Disp Refills    atorvastatin (LIPITOR) 10 MG tablet [Pharmacy Med Name: ATORVASTATIN 10 MG TABLET] 90 tablet 1     Sig: TAKE 1 TABLET BY MOUTH EVERY DAY    spironolactone (ALDACTONE) 25 MG tablet [Pharmacy Med Name: SPIRONOLACTONE 25 MG TABLET] 90 tablet 1     Sig: TAKE 1 TABLET BY MOUTH EVERY DAY         Last Office Visit:   7/23/2020      Next Visit Date:  Future Appointments   Date Time Provider Nannette Rebollar   1/25/2021  9:00 AM MD Nannette Oglesby

## 2021-01-18 RX ORDER — WARFARIN SODIUM 10 MG/1
TABLET ORAL
Qty: 90 TABLET | Refills: 0 | Status: SHIPPED | OUTPATIENT
Start: 2021-01-18 | End: 2021-04-05 | Stop reason: SDUPTHER

## 2021-01-18 NOTE — TELEPHONE ENCOUNTER
Rx request   Requested Prescriptions     Pending Prescriptions Disp Refills    warfarin (COUMADIN) 10 MG tablet [Pharmacy Med Name: WARFARIN SODIUM 10 MG TABLET] 90 tablet      Sig: TAKE 1 TABLET BY MOUTH EVERY DAY     LOV 7/23/2020  Next Visit Date:  Future Appointments   Date Time Provider Nannette Rebollar   2/22/2021  8:30 AM MD Nannette Elder

## 2021-01-19 DIAGNOSIS — Z79.01 ANTICOAGULANT LONG-TERM USE: ICD-10-CM

## 2021-01-19 DIAGNOSIS — I82.403 RECURRENT DEEP VEIN THROMBOSIS (DVT) OF BOTH LOWER EXTREMITIES (HCC): ICD-10-CM

## 2021-01-19 DIAGNOSIS — Z79.01 ANTICOAGULANT LONG-TERM USE: Primary | ICD-10-CM

## 2021-01-19 DIAGNOSIS — I26.99 PULMONARY EMBOLISM, OTHER, UNSPECIFIED CHRONICITY, UNSPECIFIED WHETHER ACUTE COR PULMONALE PRESENT (HCC): ICD-10-CM

## 2021-01-19 LAB
INR BLD: 1.4
PROTHROMBIN TIME: 17.5 SEC (ref 12.3–14.9)

## 2021-01-21 DIAGNOSIS — I82.403 RECURRENT DEEP VEIN THROMBOSIS (DVT) OF BOTH LOWER EXTREMITIES (HCC): Primary | ICD-10-CM

## 2021-02-23 DIAGNOSIS — I82.403 RECURRENT DEEP VEIN THROMBOSIS (DVT) OF BOTH LOWER EXTREMITIES (HCC): ICD-10-CM

## 2021-02-23 LAB
INR BLD: 1.6
PROTHROMBIN TIME: 19 SEC (ref 12.3–14.9)

## 2021-03-04 ENCOUNTER — TELEPHONE (OUTPATIENT)
Dept: FAMILY MEDICINE CLINIC | Age: 66
End: 2021-03-04

## 2021-04-05 ENCOUNTER — OFFICE VISIT (OUTPATIENT)
Dept: PRIMARY CARE CLINIC | Age: 66
End: 2021-04-05
Payer: MEDICARE

## 2021-04-05 VITALS
SYSTOLIC BLOOD PRESSURE: 112 MMHG | HEIGHT: 64 IN | WEIGHT: 200 LBS | BODY MASS INDEX: 34.15 KG/M2 | DIASTOLIC BLOOD PRESSURE: 70 MMHG | OXYGEN SATURATION: 99 % | HEART RATE: 64 BPM | TEMPERATURE: 98.5 F

## 2021-04-05 VITALS
HEIGHT: 64 IN | SYSTOLIC BLOOD PRESSURE: 112 MMHG | BODY MASS INDEX: 34.15 KG/M2 | OXYGEN SATURATION: 99 % | TEMPERATURE: 98.5 F | DIASTOLIC BLOOD PRESSURE: 70 MMHG | HEART RATE: 64 BPM | WEIGHT: 200 LBS

## 2021-04-05 DIAGNOSIS — K27.9 PEPTIC ULCER: ICD-10-CM

## 2021-04-05 DIAGNOSIS — D50.9 IRON DEFICIENCY ANEMIA, UNSPECIFIED IRON DEFICIENCY ANEMIA TYPE: ICD-10-CM

## 2021-04-05 DIAGNOSIS — Z12.31 ENCOUNTER FOR SCREENING MAMMOGRAM FOR BREAST CANCER: ICD-10-CM

## 2021-04-05 DIAGNOSIS — E78.00 PURE HYPERCHOLESTEROLEMIA: ICD-10-CM

## 2021-04-05 DIAGNOSIS — I26.99 RECURRENT PULMONARY EMBOLI (HCC): ICD-10-CM

## 2021-04-05 DIAGNOSIS — I82.503 CHRONIC DEEP VEIN THROMBOSIS (DVT) OF BOTH LOWER EXTREMITIES, UNSPECIFIED VEIN (HCC): ICD-10-CM

## 2021-04-05 DIAGNOSIS — E78.00 PURE HYPERCHOLESTEROLEMIA: Primary | ICD-10-CM

## 2021-04-05 DIAGNOSIS — Z12.11 COLON CANCER SCREENING: ICD-10-CM

## 2021-04-05 DIAGNOSIS — Z12.31 SCREENING MAMMOGRAM FOR HIGH-RISK PATIENT: ICD-10-CM

## 2021-04-05 DIAGNOSIS — Z00.00 ROUTINE GENERAL MEDICAL EXAMINATION AT A HEALTH CARE FACILITY: Primary | ICD-10-CM

## 2021-04-05 DIAGNOSIS — E03.9 HYPOTHYROIDISM, UNSPECIFIED TYPE: ICD-10-CM

## 2021-04-05 DIAGNOSIS — I10 ESSENTIAL HYPERTENSION: ICD-10-CM

## 2021-04-05 DIAGNOSIS — R60.0 LOCALIZED EDEMA: ICD-10-CM

## 2021-04-05 DIAGNOSIS — G62.9 NEUROPATHY: ICD-10-CM

## 2021-04-05 LAB
ALBUMIN SERPL-MCNC: 4.2 G/DL (ref 3.5–4.6)
ALP BLD-CCNC: 50 U/L (ref 40–130)
ALT SERPL-CCNC: 7 U/L (ref 0–33)
ANION GAP SERPL CALCULATED.3IONS-SCNC: 9 MEQ/L (ref 9–15)
AST SERPL-CCNC: 17 U/L (ref 0–35)
BASOPHILS ABSOLUTE: 0 K/UL (ref 0–0.2)
BASOPHILS RELATIVE PERCENT: 0.7 %
BILIRUB SERPL-MCNC: <0.2 MG/DL (ref 0.2–0.7)
BUN BLDV-MCNC: 20 MG/DL (ref 8–23)
CALCIUM SERPL-MCNC: 9.3 MG/DL (ref 8.5–9.9)
CHLORIDE BLD-SCNC: 107 MEQ/L (ref 95–107)
CHOLESTEROL, TOTAL: 151 MG/DL (ref 0–199)
CO2: 25 MEQ/L (ref 20–31)
CREAT SERPL-MCNC: 0.83 MG/DL (ref 0.5–0.9)
EOSINOPHILS ABSOLUTE: 0.2 K/UL (ref 0–0.7)
EOSINOPHILS RELATIVE PERCENT: 3.3 %
GFR AFRICAN AMERICAN: >60
GFR NON-AFRICAN AMERICAN: >60
GLOBULIN: 2.6 G/DL (ref 2.3–3.5)
GLUCOSE BLD-MCNC: 99 MG/DL (ref 70–99)
HCT VFR BLD CALC: 36.7 % (ref 37–47)
HDLC SERPL-MCNC: 42 MG/DL (ref 40–59)
HEMOGLOBIN: 12.3 G/DL (ref 12–16)
INR BLD: 3.5
LDL CHOLESTEROL CALCULATED: 87 MG/DL (ref 0–129)
LYMPHOCYTES ABSOLUTE: 1.2 K/UL (ref 1–4.8)
LYMPHOCYTES RELATIVE PERCENT: 24.8 %
MCH RBC QN AUTO: 33.3 PG (ref 27–31.3)
MCHC RBC AUTO-ENTMCNC: 33.6 % (ref 33–37)
MCV RBC AUTO: 99.1 FL (ref 82–100)
MONOCYTES ABSOLUTE: 0.6 K/UL (ref 0.2–0.8)
MONOCYTES RELATIVE PERCENT: 12 %
NEUTROPHILS ABSOLUTE: 2.9 K/UL (ref 1.4–6.5)
NEUTROPHILS RELATIVE PERCENT: 59.2 %
PDW BLD-RTO: 12.7 % (ref 11.5–14.5)
PLATELET # BLD: 237 K/UL (ref 130–400)
POTASSIUM SERPL-SCNC: 5.3 MEQ/L (ref 3.4–4.9)
PROTHROMBIN TIME: 34.9 SEC (ref 12.3–14.9)
RBC # BLD: 3.7 M/UL (ref 4.2–5.4)
SODIUM BLD-SCNC: 141 MEQ/L (ref 135–144)
TOTAL PROTEIN: 6.8 G/DL (ref 6.3–8)
TRIGL SERPL-MCNC: 108 MG/DL (ref 0–150)
TSH REFLEX: 1.73 UIU/ML (ref 0.44–3.86)
WBC # BLD: 4.9 K/UL (ref 4.8–10.8)

## 2021-04-05 PROCEDURE — G8427 DOCREV CUR MEDS BY ELIG CLIN: HCPCS | Performed by: INTERNAL MEDICINE

## 2021-04-05 PROCEDURE — G8417 CALC BMI ABV UP PARAM F/U: HCPCS | Performed by: INTERNAL MEDICINE

## 2021-04-05 PROCEDURE — 3017F COLORECTAL CA SCREEN DOC REV: CPT | Performed by: INTERNAL MEDICINE

## 2021-04-05 PROCEDURE — 1036F TOBACCO NON-USER: CPT | Performed by: INTERNAL MEDICINE

## 2021-04-05 PROCEDURE — G0402 INITIAL PREVENTIVE EXAM: HCPCS | Performed by: INTERNAL MEDICINE

## 2021-04-05 PROCEDURE — 99214 OFFICE O/P EST MOD 30 MIN: CPT | Performed by: INTERNAL MEDICINE

## 2021-04-05 RX ORDER — SPIRONOLACTONE 25 MG/1
TABLET ORAL
Qty: 90 TABLET | Refills: 3 | Status: SHIPPED | OUTPATIENT
Start: 2021-04-05 | End: 2022-05-31

## 2021-04-05 RX ORDER — GABAPENTIN 300 MG/1
300 CAPSULE ORAL 3 TIMES DAILY
COMMUNITY
Start: 2020-10-08 | End: 2021-04-05 | Stop reason: SDUPTHER

## 2021-04-05 RX ORDER — WARFARIN SODIUM 10 MG/1
10 TABLET ORAL DAILY
Qty: 90 TABLET | Refills: 3 | Status: SHIPPED | OUTPATIENT
Start: 2021-04-05 | End: 2022-03-30

## 2021-04-05 RX ORDER — OMEPRAZOLE 40 MG/1
40 CAPSULE, DELAYED RELEASE ORAL DAILY
Qty: 90 CAPSULE | Refills: 11 | Status: SHIPPED | OUTPATIENT
Start: 2021-04-05 | End: 2022-06-23

## 2021-04-05 RX ORDER — METOPROLOL SUCCINATE 50 MG/1
50 TABLET, EXTENDED RELEASE ORAL DAILY
Qty: 90 TABLET | Refills: 3 | Status: SHIPPED | OUTPATIENT
Start: 2021-04-05 | End: 2022-03-30

## 2021-04-05 RX ORDER — ATORVASTATIN CALCIUM 10 MG/1
10 TABLET, FILM COATED ORAL DAILY
Qty: 90 TABLET | Refills: 3 | Status: SHIPPED | OUTPATIENT
Start: 2021-04-05 | End: 2022-06-15

## 2021-04-05 RX ORDER — GABAPENTIN 300 MG/1
300 CAPSULE ORAL 3 TIMES DAILY
Qty: 90 CAPSULE | Refills: 2 | Status: SHIPPED | OUTPATIENT
Start: 2021-04-05 | End: 2021-08-10 | Stop reason: SDUPTHER

## 2021-04-05 RX ORDER — ACETAMINOPHEN 325 MG/1
TABLET ORAL EVERY 6 HOURS
COMMUNITY
Start: 2020-07-10 | End: 2022-01-18 | Stop reason: ALTCHOICE

## 2021-04-05 ASSESSMENT — PATIENT HEALTH QUESTIONNAIRE - PHQ9
1. LITTLE INTEREST OR PLEASURE IN DOING THINGS: 0
SUM OF ALL RESPONSES TO PHQ QUESTIONS 1-9: 0
SUM OF ALL RESPONSES TO PHQ QUESTIONS 1-9: 0
2. FEELING DOWN, DEPRESSED OR HOPELESS: 0
SUM OF ALL RESPONSES TO PHQ QUESTIONS 1-9: 0

## 2021-04-05 ASSESSMENT — LIFESTYLE VARIABLES
AUDIT-C TOTAL SCORE: INCOMPLETE
AUDIT TOTAL SCORE: INCOMPLETE
HOW OFTEN DO YOU HAVE A DRINK CONTAINING ALCOHOL: 0
HOW OFTEN DO YOU HAVE A DRINK CONTAINING ALCOHOL: NEVER

## 2021-04-06 ENCOUNTER — TELEPHONE (OUTPATIENT)
Dept: PRIMARY CARE CLINIC | Age: 66
End: 2021-04-06

## 2021-04-17 PROBLEM — I26.99 RECURRENT PULMONARY EMBOLI (HCC): Status: ACTIVE | Noted: 2021-04-17

## 2021-04-17 ASSESSMENT — ENCOUNTER SYMPTOMS
ABDOMINAL DISTENTION: 0
BLOOD IN STOOL: 0
PHOTOPHOBIA: 0
COUGH: 0
APNEA: 0

## 2021-04-17 NOTE — PROGRESS NOTES
Min Cord 61 y.o. female presents today with   Chief Complaint   Patient presents with    Medicare AWV       HPI annual wellness visit    Past Medical History:   Diagnosis Date    Chronic pain of right ankle 2016    twisting fall, arthritis    DVT of deep femoral vein (HCC)     x 2, after surgery knee, other spontanous     Hiatal hernia     History of radiofrequency ablation (RFA) procedure for cardiac arrhythmia     SVT    Hx of blood clots     Hypertension     Leg pain, right     ulcers    Low back pain radiating to right leg     Neuropathy, leg     Osteoarthritis     Peptic ulcer     cause of anemia    Pulmonary emboli ([address] 2001    x 2, right leg, left leg    Venous ulcer of right leg (HCC)     chronic pain     Patient Active Problem List    Diagnosis Date Noted    Recurrent pulmonary emboli ([address] 2021     Past Surgical History:   Procedure Laterality Date     SECTION      JOINT REPLACEMENT Bilateral      Family History   Problem Relation Age of Onset    Stroke Mother     Breast Cancer Mother     High Blood Pressure Mother     Stroke Father     High Blood Pressure Father      Social History     Socioeconomic History    Marital status:      Spouse name: None    Number of children: None    Years of education: None    Highest education level: None   Occupational History    None   Social Needs    Financial resource strain: Not hard at all   [employer] insecurity     Worry: Never true     Inability: Never true   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     Medical: None     Non-medical: None   Tobacco Use    Smoking status: Never Smoker    Smokeless tobacco: Never Used   Substance and Sexual Activity    Alcohol use: No    Drug use: No    Sexual activity: None   Lifestyle    Physical activity     Days per week: None     Minutes per session: None    Stress: None   Relationships    Social connections     Talks on phone: None     Gets together: None     Attends Druze service: None     Active member of club or organization: None     Attends meetings of clubs or organizations: None     Relationship status: None    Intimate partner violence     Fear of current or ex partner: None     Emotionally abused: None     Physically abused: None     Forced sexual activity: None   Other Topics Concern    None   Social History Narrative    None     No Known Allergies    Review of Systems        Vitals:    21 0835   BP: 112/70   Pulse: 64   Temp: 98.5 °F (36.9 °C)   SpO2: 99%   Weight: 200 lb (90.7 kg)   Height: 5' 4\" (1.626 m)       Physical Exam  Assessment/Plan  Pepito Wiggins was seen today for medicare awv. Diagnoses and all orders for this visit:    Routine general medical examination at a health care facility    Encounter for screening mammogram for breast cancer  -     SANDHYA Digital Screen Bilateral [[id number]]; Future    Colon cancer screening  -     Cologuard (For External Results Only); Future    Recurrent pulmonary emboli ([facility] [address]        Return in 1 year (on 2022), or if symptoms worsen or fail to improve, for Medicare Annual Wellness Visit in 1 year. Keaton Cook MD    Medicare Annual Wellness Visit  Name: Mónica Cortez Date: 2021   MRN: [id number] Sex: Female   Age: 61 y.o. Ethnicity: Non-/Non    : 1955 Race: Savita Power is here for Medicare AWV    Screenings for behavioral, psychosocial and functional/safety risks, and cognitive dysfunction are all negative except as indicated below. These results, as well as other patient data from the [address] form, are documented in Flowsheets linked to this Encounter. No Known Allergies      Prior to Visit Medications    Medication Sig Taking?  Authorizing Provider   acetaminophen (TYLENOL) 325 MG tablet Take by mouth every 6 hours Yes Historical Provider, MD   ferrous gluconate ([facility] IRON) 240 (27 Fe) MG tablet Take 1 tablet by mouth daily  Patient taking differently: Take 240 mg by mouth daily Once a week Yes Gilford Reap, MD   gabapentin (NEURONTIN) 300 MG capsule Take 1 capsule by mouth 3 times daily for 90 days.   Gilford Reap, MD   metoprolol succinate (TOPROL XL) 50 MG extended release tablet Take 1 tablet by mouth daily  Gilford Reap, MD   warfarin (COUMADIN) 10 MG tablet Take 1 tablet by mouth daily  Gilford Reap, MD   atorvastatin (LIPITOR) 10 MG tablet Take 1 tablet by mouth daily  Gilford Reap, MD   spironolactone (ALDACTONE) 25 MG tablet TAKE 1 TABLET BY MOUTH EVERY DAY  Gilford Reap, MD   omeprazole (PRILOSEC) 40 MG delayed release capsule Take 1 capsule by mouth daily  Gilford Reap, MD         Past Medical History:   Diagnosis Date    Chronic pain of right ankle 2016    twisting fall, arthritis    DVT of deep femoral vein (HCC)     x 2, after surgery knee, other spontanous     Hiatal hernia     History of radiofrequency ablation (RFA) procedure for cardiac arrhythmia     SVT    Hx of blood clots     Hypertension     Leg pain, right     ulcers    Low back pain radiating to right leg     Neuropathy, leg     Osteoarthritis     Peptic ulcer     cause of anemia    Pulmonary emboli ([address] 2001    x 2, right leg, left leg    Venous ulcer of right leg (HCC)     chronic pain       Past Surgical History:   Procedure Laterality Date     SECTION      JOINT REPLACEMENT Bilateral          Family History   Problem Relation Age of Onset    Stroke Mother     Breast Cancer Mother     High Blood Pressure Mother     Stroke Father     High Blood Pressure Father        CareTeam (Including outside providers/suppliers regularly involved in providing care):   Patient Care Team:  Gilford Reap, MD as PCP - General (Internal Medicine)  Gilford Reap, MD as PCP - REHABILITATION [facility] Empaneled Provider    Wt Readings from Last 3 Encounters:   21 200 lb (90.7 kg)   21 200 lb (90.7 kg)   20 199 lb (90.3 kg)     Vitals:    21 0835   BP: 112/70   Pulse: 64   Temp: 98.5 °F (36.9 °C)   SpO2: 99%   Weight: 200 lb (90.7 kg)   Height: 5' 4\" (1.626 m)     Body mass index is 34.33 kg/m². Based upon direct observation of the patient, evaluation of cognition reveals recent and remote memory intact. Patient's complete Health Risk Assessment and screening values have been reviewed and are found in Flowsheets. The following problems were reviewed today and where indicated follow up appointments were made and/or referrals ordered. Positive Risk Factor Screenings with Interventions:            General Health and ACP:  General  In general, how would you say your health is?: Good  In the past 7 days, have you experienced any of the following?  New or Increased Pain, New or Increased Fatigue, Loneliness, Social Isolation, Stress or Anger?: (!) Stress  Do you get the social and emotional support that you need?: Yes  Do you have a Living Will?: (!) No  Advance Directives     Power of [address] [place] [address] Will ACP-Advance Directive ACP-Power of     Not on File Not on File Not on File Not on File      General Health Risk Interventions:  · discussed    Health Habits/Nutrition:  Health Habits/Nutrition  Do you exercise for at least 20 minutes 2-3 times per week?: (!) No  Have you lost any weight without trying in the past 3 months?: No  Do you eat only one meal per day?: No  Have you seen the dentist within the past year?: (!) No  Body mass index: (!) 34.33  Health Habits/Nutrition Interventions:  · discussed    Hearing/Vision:  No exam data present  Hearing/Vision  Do you or your family notice any trouble with your hearing that hasn't been managed with hearing aids?: (!) Yes  Do you have difficulty driving, watching TV, or doing any of your daily activities because of your eyesight?: No  Have you had an eye exam within the past year?: (!) No  Hearing/Vision Interventions:  · discussed      Personalized Preventive Plan   Current Health Maintenance Status  Immunization History Administered Date(s) Administered    COVID-19, Pfizer, PF, 30mcg/0.3mL 03/28/2021    Influenza Vaccine, unspecified formulation 10/01/2013, 10/01/2014, 10/01/2015, 11/12/2017    Influenza, Jonah Rialto, IM, (6 mo and older Fluzone, Flulaval, Fluarix and 3 yrs and older Afluria) 10/01/2019, 10/07/2020    Influenza, Quadv, IM, PF (6 mo and older Fluzone, Flulaval, Fluarix, and 3 yrs and older Afluria) 03/03/2017, 12/26/2018    Zoster Recombinant (Shingrix) 10/04/2019        Health Maintenance   Topic Date Due    HIV screen  Never done    DTaP/Tdap/Td vaccine (1 - Tdap) Never done    Cervical cancer screen  Never done    Colon cancer screen colonoscopy  Never done    Shingles Vaccine (2 of 2) 11/29/2019    Breast cancer screen  10/04/2020    Annual Wellness Visit (AWV)  Never done    COVID-19 Vaccine (2 - Pfizer 2-dose series) 04/18/2021    Lipid screen  04/05/2022    Potassium monitoring  04/05/2022    Creatinine monitoring  04/05/2022    Flu vaccine  Completed    Hepatitis C screen  Completed    Hepatitis A vaccine  Aged Out    Hepatitis B vaccine  Aged Out    Hib vaccine  Aged Out    Meningococcal (ACWY) vaccine  Aged Out    Pneumococcal 0-64 years Vaccine  Aged Out     Recommendations for [employer] Due: see orders and patient instructions/AVS.  . Recommended screening schedule for the next 5-10 years is provided to the patient in written form: see Patient Mariajose Cobb was seen today for medicare awv. Diagnoses and all orders for this visit:    Routine general medical examination at a health care facility    Encounter for screening mammogram for breast cancer  -     SANDHYA Digital Screen Bilateral [[id number]]; Future    Colon cancer screening  -     Cologuard (For External Results Only);  Future    Recurrent pulmonary emboli ([address]

## 2021-04-17 NOTE — PATIENT INSTRUCTIONS
Personalized Preventive Plan for Claire Spencer - 4/5/2021  Medicare offers a range of preventive health benefits. Some of the tests and screenings are paid in full while other may be subject to a deductible, co-insurance, and/or copay. Some of these benefits include a comprehensive review of your medical history including lifestyle, illnesses that may run in your family, and various assessments and screenings as appropriate. After reviewing your medical record and screening and assessments performed today your provider may have ordered immunizations, labs, imaging, and/or referrals for you. A list of these orders (if applicable) as well as your Preventive Care list are included within your After Visit Summary for your review. Other Preventive Recommendations:    · A preventive eye exam performed by an eye specialist is recommended every 1-2 years to screen for glaucoma; cataracts, macular degeneration, and other eye disorders. · A preventive dental visit is recommended every 6 months. · Try to get at least 150 minutes of exercise per week or 10,000 steps per day on a pedometer . · Order or download the FREE \"Exercise & Physical Activity: Your Everyday Guide\" from [employer]. Call 4-456.603.2872 or search [employer] online. · You need 3000-9465 mg of calcium and 1941-9564 IU of vitamin D per day. It is possible to meet your calcium requirement with diet alone, but a vitamin D supplement is usually necessary to meet this goal.  · When exposed to the sun, use a sunscreen that protects against both UVA and UVB radiation with an SPF of 30 or greater. Reapply every 2 to 3 hours or after sweating, drying off with a towel, or swimming. · Always wear a seat belt when traveling in a car. Always wear a helmet when riding a bicycle or motorcycle.

## 2021-05-10 DIAGNOSIS — I82.403 RECURRENT DEEP VEIN THROMBOSIS (DVT) OF BOTH LOWER EXTREMITIES (HCC): ICD-10-CM

## 2021-05-10 LAB
INR BLD: 1.7
PROTHROMBIN TIME: 19.6 SEC (ref 12.3–14.9)

## 2021-05-24 ENCOUNTER — HOSPITAL ENCOUNTER (OUTPATIENT)
Dept: WOMENS IMAGING | Age: 66
Discharge: HOME | End: 2021-05-26
Payer: MEDICARE

## 2021-05-24 VITALS — HEIGHT: 63 IN | BODY MASS INDEX: 35.43 KG/M2

## 2021-05-24 DIAGNOSIS — Z12.31 SCREENING MAMMOGRAM FOR HIGH-RISK PATIENT: ICD-10-CM

## 2021-05-24 PROCEDURE — 77067 SCR MAMMO BI INCL CAD: CPT

## 2021-06-14 DIAGNOSIS — I82.403 RECURRENT DEEP VEIN THROMBOSIS (DVT) OF BOTH LOWER EXTREMITIES (HCC): ICD-10-CM

## 2021-06-14 LAB
INR BLD: 1.9
PROTHROMBIN TIME: 21.4 SEC (ref 12.3–14.9)

## 2021-08-04 DIAGNOSIS — I82.403 RECURRENT DEEP VEIN THROMBOSIS (DVT) OF BOTH LOWER EXTREMITIES (HCC): ICD-10-CM

## 2021-08-04 LAB
INR BLD: 2
PROTHROMBIN TIME: 21.9 SEC (ref 12.3–14.9)

## 2021-09-29 ENCOUNTER — TELEPHONE (OUTPATIENT)
Dept: PRIMARY CARE CLINIC | Age: 66
End: 2021-09-29

## 2021-09-29 DIAGNOSIS — G62.9 NEUROPATHY: ICD-10-CM

## 2021-09-29 RX ORDER — GABAPENTIN 300 MG/1
300 CAPSULE ORAL 3 TIMES DAILY
Qty: 90 CAPSULE | Refills: 0 | Status: SHIPPED | OUTPATIENT
Start: 2021-09-29 | End: 2021-10-19 | Stop reason: SDUPTHER

## 2021-10-19 ENCOUNTER — OFFICE VISIT (OUTPATIENT)
Dept: PRIMARY CARE CLINIC | Age: 66
End: 2021-10-19
Payer: MEDICARE

## 2021-10-19 VITALS
TEMPERATURE: 98 F | OXYGEN SATURATION: 96 % | HEIGHT: 65 IN | SYSTOLIC BLOOD PRESSURE: 132 MMHG | WEIGHT: 237 LBS | DIASTOLIC BLOOD PRESSURE: 70 MMHG | HEART RATE: 88 BPM | BODY MASS INDEX: 39.49 KG/M2

## 2021-10-19 DIAGNOSIS — R73.9 HYPERGLYCEMIA: ICD-10-CM

## 2021-10-19 DIAGNOSIS — Z23 NEED FOR INFLUENZA VACCINATION: ICD-10-CM

## 2021-10-19 DIAGNOSIS — I83.813 VARICOSE VEINS OF BOTH LOWER EXTREMITIES WITH PAIN: ICD-10-CM

## 2021-10-19 DIAGNOSIS — I82.403 RECURRENT DEEP VEIN THROMBOSIS (DVT) OF BOTH LOWER EXTREMITIES (HCC): ICD-10-CM

## 2021-10-19 DIAGNOSIS — G62.9 NEUROPATHY: Primary | ICD-10-CM

## 2021-10-19 LAB
HBA1C MFR BLD: 3.5 %
INR BLD: 1.8
PROTHROMBIN TIME: 20.5 SEC (ref 12.3–14.9)

## 2021-10-19 PROCEDURE — G8482 FLU IMMUNIZE ORDER/ADMIN: HCPCS | Performed by: INTERNAL MEDICINE

## 2021-10-19 PROCEDURE — 3017F COLORECTAL CA SCREEN DOC REV: CPT | Performed by: INTERNAL MEDICINE

## 2021-10-19 PROCEDURE — 83036 HEMOGLOBIN GLYCOSYLATED A1C: CPT | Performed by: INTERNAL MEDICINE

## 2021-10-19 PROCEDURE — G8427 DOCREV CUR MEDS BY ELIG CLIN: HCPCS | Performed by: INTERNAL MEDICINE

## 2021-10-19 PROCEDURE — 99214 OFFICE O/P EST MOD 30 MIN: CPT | Performed by: INTERNAL MEDICINE

## 2021-10-19 PROCEDURE — 90674 CCIIV4 VAC NO PRSV 0.5 ML IM: CPT | Performed by: INTERNAL MEDICINE

## 2021-10-19 PROCEDURE — G8417 CALC BMI ABV UP PARAM F/U: HCPCS | Performed by: INTERNAL MEDICINE

## 2021-10-19 PROCEDURE — 1036F TOBACCO NON-USER: CPT | Performed by: INTERNAL MEDICINE

## 2021-10-19 PROCEDURE — G0008 ADMIN INFLUENZA VIRUS VAC: HCPCS | Performed by: INTERNAL MEDICINE

## 2021-10-19 RX ORDER — GABAPENTIN 300 MG/1
300 CAPSULE ORAL 3 TIMES DAILY
Qty: 90 CAPSULE | Refills: 2 | Status: SHIPPED | OUTPATIENT
Start: 2021-10-19 | End: 2022-01-18 | Stop reason: SDUPTHER

## 2021-10-19 SDOH — ECONOMIC STABILITY: TRANSPORTATION INSECURITY
IN THE PAST 12 MONTHS, HAS THE LACK OF TRANSPORTATION KEPT YOU FROM MEDICAL APPOINTMENTS OR FROM GETTING MEDICATIONS?: NO

## 2021-10-19 SDOH — ECONOMIC STABILITY: FOOD INSECURITY: WITHIN THE PAST 12 MONTHS, THE FOOD YOU BOUGHT JUST DIDN'T LAST AND YOU DIDN'T HAVE MONEY TO GET MORE.: NEVER TRUE

## 2021-10-19 SDOH — ECONOMIC STABILITY: FOOD INSECURITY: WITHIN THE PAST 12 MONTHS, YOU WORRIED THAT YOUR FOOD WOULD RUN OUT BEFORE YOU GOT MONEY TO BUY MORE.: NEVER TRUE

## 2021-10-19 SDOH — ECONOMIC STABILITY: TRANSPORTATION INSECURITY
IN THE PAST 12 MONTHS, HAS LACK OF TRANSPORTATION KEPT YOU FROM MEETINGS, WORK, OR FROM GETTING THINGS NEEDED FOR DAILY LIVING?: NO

## 2021-10-19 ASSESSMENT — ENCOUNTER SYMPTOMS
CHOKING: 0
FACIAL SWELLING: 0
ABDOMINAL DISTENTION: 0
PHOTOPHOBIA: 0
APNEA: 0
BLOOD IN STOOL: 0

## 2021-10-19 ASSESSMENT — SOCIAL DETERMINANTS OF HEALTH (SDOH): HOW HARD IS IT FOR YOU TO PAY FOR THE VERY BASICS LIKE FOOD, HOUSING, MEDICAL CARE, AND HEATING?: NOT VERY HARD

## 2021-10-19 NOTE — PROGRESS NOTES
None    Years of education: None    Highest education level: None   Occupational History    None   Tobacco Use    Smoking status: Never Smoker    Smokeless tobacco: Never Used   Vaping Use    Vaping Use: Never used   Substance and Sexual Activity    Alcohol use: No    Drug use: No    Sexual activity: None   Other Topics Concern    None   Social History Narrative    None     Social Determinants of Health     Financial Resource Strain: Low Risk     Difficulty of Paying Living Expenses: Not very hard   Food Insecurity: No Food Insecurity    Worried About Running Out of Food in the Last Year: Never true    Kerri of Food in the Last Year: Never true   Transportation Needs: No Transportation Needs    Lack of Transportation (Medical): No    Lack of Transportation (Non-Medical): No   Physical Activity:     Days of Exercise per Week:     Minutes of Exercise per Session:    Stress:     Feeling of Stress :    Social Connections:     Frequency of Communication with Friends and Family:     Frequency of Social Gatherings with Friends and Family:     Attends [employer] Services:     Active Member of Clubs or Organizations:     Attends Club or Organization Meetings:     Marital Status:    Intimate Partner Violence:     Fear of Current or Ex-Partner:     Emotionally Abused:     Physically Abused:     Sexually Abused:      No Known Allergies    Review of Systems   Constitutional: Negative for chills and fever. HENT: Negative for facial swelling and nosebleeds. Eyes: Negative for photophobia and visual disturbance. Respiratory: Negative for apnea and choking. Cardiovascular: Negative for chest pain and palpitations. Gastrointestinal: Negative for abdominal distention and blood in stool. Genitourinary: Negative for enuresis, hematuria and vaginal bleeding. Musculoskeletal: Negative for gait problem and joint swelling. Skin: Negative for rash.    Neurological: Positive for tingling and numbness. Negative for syncope and speech difficulty. Hematological: Does not bruise/bleed easily. Psychiatric/Behavioral: Negative for hallucinations and suicidal ideas. Vitals:    10/19/21 0950 10/19/21 0957   BP: (!) 146/78 132/70   Site: Left Upper Arm Right Upper Arm   Cuff Size: Large Adult Large Adult   Pulse: 88    Temp: 98 °F (36.7 °C)    SpO2: 96%    Weight: 237 lb (107.5 kg)    Height: 5' 5\" (1.651 m)        Physical Exam  Constitutional:       Appearance: She is well-developed. HENT:      Head: Normocephalic and atraumatic. Eyes:      Pupils: Pupils are equal, round, and reactive to light. Cardiovascular:      Rate and Rhythm: Normal rate and regular rhythm. Heart sounds: Normal heart sounds. Pulmonary:      Effort: No respiratory distress. Breath sounds: Normal breath sounds. Abdominal:      General: There is no distension. Musculoskeletal:         General: Normal range of motion. Cervical back: Normal range of motion and neck supple. Skin:         Neurological:      Mental Status: She is alert and oriented to person, place, and time. Cranial Nerves: No cranial nerve deficit. Psychiatric:         Mood and Affect: Mood normal.        She will call if she wants to see cardiology for follow up on svt  Assessment/Plan  Mitch Benavidez was seen today for 6 month follow-up, leg pain, neurologic problem, medication refill and irregular heart beat. Diagnoses and all orders for this visit:    Neuropathy  -     gabapentin (NEURONTIN) 300 MG capsule; Take 1 capsule by mouth 3 times daily for 30 days.     Varicose veins of both lower extremities with pain  -     Stephy Saha MD, Interventional Radiology, [place]    Hyperglycemia  -     POCT glycosylated hemoglobin (Hb A1C)    Need for influenza vaccination  -     INFLUENZA, MDCK QUADV, 2 YRS AND OLDER, IM, PF, PREFILL SYR OR SDV, 0.5ML (FLUCELVAX QUADV, PF)    Controlled Substances Monitoring: Periodic Controlled Substance Monitoring: Possible medication side effects, risk of tolerance/dependence & alternative treatments discussed., No signs of potential drug abuse or diversion identified. , Assessed functional status. Eliel Mistry MD)        Return in about 3 months (around 1/19/2022), or if symptoms worsen or fail to improve.     Eliel Mistry MD

## 2021-11-11 ENCOUNTER — INITIAL CONSULT (OUTPATIENT)
Dept: INTERVENTIONAL RADIOLOGY/VASCULAR | Age: 66
End: 2021-11-11
Payer: MEDICARE

## 2021-11-11 VITALS
HEART RATE: 78 BPM | HEIGHT: 65 IN | SYSTOLIC BLOOD PRESSURE: 128 MMHG | DIASTOLIC BLOOD PRESSURE: 84 MMHG | WEIGHT: 237 LBS | BODY MASS INDEX: 39.49 KG/M2 | RESPIRATION RATE: 12 BRPM

## 2021-11-11 DIAGNOSIS — M79.89 PAIN AND SWELLING OF LOWER LEG, UNSPECIFIED LATERALITY: ICD-10-CM

## 2021-11-11 DIAGNOSIS — I83.899 HEMOSIDERIN PIGMENTATION OF LOWER EXTREMITY DUE TO VARICOSE VEINS: ICD-10-CM

## 2021-11-11 DIAGNOSIS — I73.9 PERIPHERAL VASCULAR DISEASE, UNSPECIFIED (HCC): ICD-10-CM

## 2021-11-11 DIAGNOSIS — E66.01 SEVERE OBESITY (BMI 35.0-35.9 WITH COMORBIDITY) (HCC): ICD-10-CM

## 2021-11-11 DIAGNOSIS — Z86.718 H/O DEEP VENOUS THROMBOSIS: ICD-10-CM

## 2021-11-11 DIAGNOSIS — M79.669 PAIN AND SWELLING OF LOWER LEG, UNSPECIFIED LATERALITY: ICD-10-CM

## 2021-11-11 DIAGNOSIS — R29.898 LEG FATIGUE: ICD-10-CM

## 2021-11-11 DIAGNOSIS — L81.8 HEMOSIDERIN PIGMENTATION OF LOWER EXTREMITY DUE TO VARICOSE VEINS: ICD-10-CM

## 2021-11-11 DIAGNOSIS — R29.898 HEAVY SENSATION OF LOWER EXTREMITY: ICD-10-CM

## 2021-11-11 DIAGNOSIS — I87.003: ICD-10-CM

## 2021-11-11 DIAGNOSIS — I83.813 VARICOSE VEINS OF LEG WITH PAIN, BILATERAL: Primary | ICD-10-CM

## 2021-11-11 DIAGNOSIS — Z86.711 HISTORY OF PULMONARY EMBOLISM: ICD-10-CM

## 2021-11-11 PROCEDURE — G8482 FLU IMMUNIZE ORDER/ADMIN: HCPCS | Performed by: NURSE PRACTITIONER

## 2021-11-11 PROCEDURE — G8427 DOCREV CUR MEDS BY ELIG CLIN: HCPCS | Performed by: NURSE PRACTITIONER

## 2021-11-11 PROCEDURE — 99204 OFFICE O/P NEW MOD 45 MIN: CPT | Performed by: NURSE PRACTITIONER

## 2021-11-11 PROCEDURE — G8417 CALC BMI ABV UP PARAM F/U: HCPCS | Performed by: NURSE PRACTITIONER

## 2021-11-11 ASSESSMENT — ENCOUNTER SYMPTOMS
COLOR CHANGE: 1
BACK PAIN: 1
CONSTIPATION: 0
EYE DISCHARGE: 0
EYE PAIN: 0
EYES NEGATIVE: 1
DIARRHEA: 0
NAUSEA: 0
COUGH: 0
WHEEZING: 0
EYE ITCHING: 0
ABDOMINAL PAIN: 0
SHORTNESS OF BREATH: 1
GASTROINTESTINAL NEGATIVE: 1
ABDOMINAL DISTENTION: 0
SORE THROAT: 0
EYE REDNESS: 0
VOMITING: 0
TROUBLE SWALLOWING: 0

## 2021-11-11 NOTE — PROGRESS NOTES
Meli Fan, a female of 61 y.o. came to the office 2021. Chief Complaint   Patient presents with   [facility].     Referral from Dr. Caroline Hager for varicose veins of bilateral lower extremities with pain       2021 HPI: Meli Fan referred by PCP for evaluation of BLE painful varicose veins. Patient presents with symptoms of: Both lower legs with chronic skin discoloration darkening, varicose veins throughout both legs with swelling and pain, chronic lower leg edema, aching, fatigue and heaviness sensation. Symptoms exacerbated with prolonged standing. Symptoms daily going on for several years with progression over years. H/O BLE water blisters due to edema with H/O BLE skin grafting. NSAIDS: Ibuprofen PRN OTC meds with minimal relief. Leg elevation: Daily with minimal relief. Stocking use and dates: Has not done conservative therapy with daily consistent wearing of class two compression stockings for at least 6 weeks. Denies claudication. Denies chest pain. Denies dyspnea. PAD risk factors: HTN, obesity, HLD  Warfarin H/O BLE DVT and PE managed by PCP. No bleeding concerns noted. Lives daily active lifestyle. Plan is to attempt dieting.        Family History   Problem Relation Age of Onset    Stroke Mother     Breast Cancer Mother     High Blood Pressure Mother     Stroke Father     High Blood Pressure Father        Past Surgical History:   Procedure Laterality Date     SECTION      JOINT REPLACEMENT Bilateral         Past Medical History:   Diagnosis Date    Chronic pain of right ankle 2016    twisting fall, arthritis    DVT of deep femoral vein (HCC)     x 2, after surgery knee, other spontanous     Hiatal hernia     History of radiofrequency ablation (RFA) procedure for cardiac arrhythmia     SVT    Hx of blood clots     Hypertension     Leg pain, right     ulcers    Low back pain radiating to right leg     Neuropathy, leg     Osteoarthritis     Peptic ulcer 2011    cause of anemia    Pulmonary emboli ([facility] [address] 2001    x 2, right leg, left leg    Venous ulcer of right leg (HCC)     chronic pain       Social History     Socioeconomic History    Marital status:      Spouse name: None    Number of children: None    Years of education: None    Highest education level: None   Occupational History    None   Tobacco Use    Smoking status: Never Smoker    Smokeless tobacco: Never Used   Vaping Use    Vaping Use: Never used   Substance and Sexual Activity    Alcohol use: No    Drug use: No    Sexual activity: None   Other Topics Concern    None   Social History Narrative    None     Social Determinants of Health     Financial Resource Strain: Low Risk     Difficulty of Paying Living Expenses: Not very hard   Food Insecurity: No Food Insecurity    Worried About Running Out of Food in the Last Year: Never true    Kerri of Food in the Last Year: Never true   Transportation Needs: No Transportation Needs    Lack of Transportation (Medical): No    Lack of Transportation (Non-Medical):  No   Physical Activity:     Days of Exercise per Week: Not on file    Minutes of Exercise per Session: Not on file   Stress:     Feeling of Stress : Not on file   Social Connections:     Frequency of Communication with Friends and Family: Not on file    Frequency of Social Gatherings with Friends and Family: Not on file    Attends Jain Services: Not on file    Active Member of Clubs or Organizations: Not on file    Attends Club or Organization Meetings: Not on file    Marital Status: Not on file   Intimate Partner Violence:     Fear of Current or Ex-Partner: Not on file    Emotionally Abused: Not on file    Physically Abused: Not on file    Sexually Abused: Not on file   Housing Stability:     Unable to Pay for Housing in the Last Year: Not on file    Number of [place] in the Last Year: Not on file    Unstable Housing compression to legs once US scan and results done. --  Elevate PRN LE's when sitting and/or lying for management of LE edema. --  Follow up with general practitioner for other medical concerns and management. Thank You Dr. Patel Hurley for referral of your patient to our practice for care.      Moi Stephens, ALO - CNP

## 2021-11-23 DIAGNOSIS — I82.403 RECURRENT DEEP VEIN THROMBOSIS (DVT) OF BOTH LOWER EXTREMITIES (HCC): ICD-10-CM

## 2021-11-23 LAB
INR BLD: 1.7
PROTHROMBIN TIME: 19.7 SEC (ref 12.3–14.9)

## 2021-12-13 ENCOUNTER — OFFICE VISIT (OUTPATIENT)
Dept: INTERVENTIONAL RADIOLOGY/VASCULAR | Age: 66
End: 2021-12-13
Payer: MEDICARE

## 2021-12-13 VITALS
HEART RATE: 78 BPM | BODY MASS INDEX: 39.44 KG/M2 | WEIGHT: 237 LBS | SYSTOLIC BLOOD PRESSURE: 128 MMHG | DIASTOLIC BLOOD PRESSURE: 84 MMHG

## 2021-12-13 DIAGNOSIS — I83.899 HEMOSIDERIN PIGMENTATION OF LOWER EXTREMITY DUE TO VARICOSE VEINS: ICD-10-CM

## 2021-12-13 DIAGNOSIS — I87.003: ICD-10-CM

## 2021-12-13 DIAGNOSIS — I82.403 RECURRENT DEEP VEIN THROMBOSIS (DVT) OF BOTH LOWER EXTREMITIES (HCC): ICD-10-CM

## 2021-12-13 DIAGNOSIS — Z86.718 H/O DEEP VENOUS THROMBOSIS: ICD-10-CM

## 2021-12-13 DIAGNOSIS — L81.8 HEMOSIDERIN PIGMENTATION OF LOWER EXTREMITY DUE TO VARICOSE VEINS: ICD-10-CM

## 2021-12-13 DIAGNOSIS — M79.89 PAIN AND SWELLING OF LOWER LEG, UNSPECIFIED LATERALITY: ICD-10-CM

## 2021-12-13 DIAGNOSIS — I87.2 EDEMA OF BOTH LOWER EXTREMITIES DUE TO PERIPHERAL VENOUS INSUFFICIENCY: Primary | ICD-10-CM

## 2021-12-13 DIAGNOSIS — I83.813 VARICOSE VEINS OF LEG WITH PAIN, BILATERAL: ICD-10-CM

## 2021-12-13 DIAGNOSIS — M79.669 PAIN AND SWELLING OF LOWER LEG, UNSPECIFIED LATERALITY: ICD-10-CM

## 2021-12-13 DIAGNOSIS — R29.898 LEG FATIGUE: ICD-10-CM

## 2021-12-13 DIAGNOSIS — I73.9 PERIPHERAL VASCULAR DISEASE, UNSPECIFIED (HCC): ICD-10-CM

## 2021-12-13 DIAGNOSIS — R29.898 HEAVY SENSATION OF LOWER EXTREMITY: ICD-10-CM

## 2021-12-13 DIAGNOSIS — E66.01 SEVERE OBESITY (BMI 35.0-35.9 WITH COMORBIDITY) (HCC): ICD-10-CM

## 2021-12-13 LAB
INR BLD: 2.5
PROTHROMBIN TIME: 26.6 SEC (ref 12.3–14.9)

## 2021-12-13 PROCEDURE — G8417 CALC BMI ABV UP PARAM F/U: HCPCS | Performed by: NURSE PRACTITIONER

## 2021-12-13 PROCEDURE — 99214 OFFICE O/P EST MOD 30 MIN: CPT | Performed by: NURSE PRACTITIONER

## 2021-12-13 PROCEDURE — 3017F COLORECTAL CA SCREEN DOC REV: CPT | Performed by: NURSE PRACTITIONER

## 2021-12-13 PROCEDURE — 1036F TOBACCO NON-USER: CPT | Performed by: NURSE PRACTITIONER

## 2021-12-13 PROCEDURE — G8482 FLU IMMUNIZE ORDER/ADMIN: HCPCS | Performed by: NURSE PRACTITIONER

## 2021-12-13 PROCEDURE — G8427 DOCREV CUR MEDS BY ELIG CLIN: HCPCS | Performed by: NURSE PRACTITIONER

## 2021-12-13 ASSESSMENT — ENCOUNTER SYMPTOMS
SORE THROAT: 0
EYE PAIN: 0
EYE REDNESS: 0
GASTROINTESTINAL NEGATIVE: 1
EYES NEGATIVE: 1
WHEEZING: 0
ABDOMINAL DISTENTION: 0
ABDOMINAL PAIN: 0
TROUBLE SWALLOWING: 0
COLOR CHANGE: 1
SHORTNESS OF BREATH: 0
EYE ITCHING: 0
DIARRHEA: 0
NAUSEA: 0
VOMITING: 0
COUGH: 0
CONSTIPATION: 0
BACK PAIN: 1
EYE DISCHARGE: 0

## 2021-12-13 NOTE — PROGRESS NOTES
Vascular Medicine and Interventional Radiology:    PROGRESS NOTE:     SUBJECTIVE:     Da King, a female of 61 y.o. came to the office 12/13/2021. Chief Complaint   Patient presents with    Results     us results      12/13/2021: Da King returns for results of BLE venous duplex and insufficiency studies. There is noted CVI to bilateral GSV and left SSV. She reports symptoms of  Both lower legs with chronic skin discoloration darkening, varicose veins throughout both legs with swelling and pain, chronic lower leg edema, aching, fatigue and heaviness sensation. Symptoms exacerbated with prolonged standing. Symptoms daily going on for several years with progression over years persist unchanged. Stocking use and dates: Has not done conservative therapy with daily consistent wearing of class two compression stockings for at least 6 weeks. H/O BLE water blisters due to edema with H/O BLE skin grafting. NSAIDS: Ibuprofen PRN OTC meds with minimal relief. Leg elevation: Daily with minimal relief. Denies claudication. Denies chest pain. Denies dyspnea. PAD risk factors: HTN, obesity, HLD  Warfarin H/O BLE DVT and PE managed by PCP. No bleeding concerns noted. Lives daily active lifestyle. Plan is to attempt dieting. 11/11/2021 HPI: Da King referred by PCP for evaluation of BLE painful varicose veins. Patient presents with symptoms of: Both lower legs with chronic skin discoloration darkening, varicose veins throughout both legs with swelling and pain, chronic lower leg edema, aching, fatigue and heaviness sensation. Symptoms exacerbated with prolonged standing. Symptoms daily going on for several years with progression over years. H/O BLE water blisters due to edema with H/O BLE skin grafting. NSAIDS: Ibuprofen PRN OTC meds with minimal relief. Leg elevation: Daily with minimal relief.    Stocking use and dates: Has not done conservative therapy with daily consistent wearing of class two compression stockings for at least 6 weeks. Denies claudication. Denies chest pain. Denies dyspnea. PAD risk factors: HTN, obesity, HLD  Warfarin H/O BLE DVT and PE managed by PCP. No bleeding concerns noted. Lives daily active lifestyle. Plan is to attempt dieting.        Family History   Problem Relation Age of Onset    Stroke Mother     Breast Cancer Mother     High Blood Pressure Mother     Stroke Father     High Blood Pressure Father        Past Surgical History:   Procedure Laterality Date     SECTION      JOINT REPLACEMENT Bilateral         Past Medical History:   Diagnosis Date    Chronic pain of right ankle 2016    twisting fall, arthritis    DVT of deep femoral vein (HCC)     x 2, after surgery knee, other spontanous     Hiatal hernia     History of radiofrequency ablation (RFA) procedure for cardiac arrhythmia     SVT    Hx of blood clots     Hypertension     Leg pain, right     ulcers    Low back pain radiating to right leg     Neuropathy, leg     Osteoarthritis     Peptic ulcer     cause of anemia    Pulmonary emboli ([facility] [address] 2001    x 2, right leg, left leg    Venous ulcer of right leg (HCC)     chronic pain       Social History     Socioeconomic History    Marital status:      Spouse name: Not on file    Number of children: Not on file    Years of education: Not on file    Highest education level: Not on file   Occupational History    Not on file   Tobacco Use    Smoking status: Never Smoker    Smokeless tobacco: Never Used   Vaping Use    Vaping Use: Never used   Substance and Sexual Activity    Alcohol use: No    Drug use: No    Sexual activity: Not on file   Other Topics Concern    Not on file   Social History Narrative    Not on file     Social Determinants of Health     Financial Resource Strain: Low Risk     Difficulty of Paying Living Expenses: Not very hard   Food Insecurity: No Food Insecurity    Worried About [facility] in the Last Year: Never true    Kerri of Food in the Last Year: Never true   Transportation Needs: No Transportation Needs    Lack of Transportation (Medical): No    Lack of Transportation (Non-Medical): No   Physical Activity:     Days of Exercise per Week: Not on file    Minutes of Exercise per Session: Not on file   Stress:     Feeling of Stress : Not on file   Social Connections:     Frequency of Communication with Friends and Family: Not on file    Frequency of Social Gatherings with Friends and Family: Not on file    Attends Baptism Services: Not on file    Active Member of [address] [place] [address] or Organizations: Not on file    Attends Club or Organization Meetings: Not on file    Marital Status: Not on file   Intimate Partner Violence:     Fear of Current or Ex-Partner: Not on file    Emotionally Abused: Not on file    Physically Abused: Not on file    Sexually Abused: Not on file   Housing Stability:     Unable to Pay for Housing in the Last Year: Not on file    Number of [place] in the Last Year: Not on file    Unstable Housing in the Last Year: Not on file       No Known Allergies    Current Outpatient Medications on File Prior to Visit   Medication Sig Dispense Refill    gabapentin (NEURONTIN) 300 MG capsule Take 1 capsule by mouth 3 times daily for 30 days.  90 capsule 2    acetaminophen (TYLENOL) 325 MG tablet Take by mouth every 6 hours      metoprolol succinate (TOPROL XL) 50 MG extended release tablet Take 1 tablet by mouth daily 90 tablet 3    warfarin (COUMADIN) 10 MG tablet Take 1 tablet by mouth daily 90 tablet 3    atorvastatin (LIPITOR) 10 MG tablet Take 1 tablet by mouth daily 90 tablet 3    spironolactone (ALDACTONE) 25 MG tablet TAKE 1 TABLET BY MOUTH EVERY DAY 90 tablet 3    omeprazole (PRILOSEC) 40 MG delayed release capsule Take 1 capsule by mouth daily 90 capsule 11     No current facility-administered medications on file prior to Posterior tibial pulses are 2+ on the right side and 2+ on the left side. Comments: Multiple large and small varicosities throughout both legs with tenderness and edema. Pulmonary:      Effort: Pulmonary effort is normal.   Abdominal:      Palpations: Abdomen is soft. Musculoskeletal:         General: Tenderness (With palpation to bilateral lower legs) present. No swelling, deformity or signs of injury. Cervical back: Normal range of motion. Right lower le+ Edema present. Left lower le+ Edema present. Skin:     General: Skin is warm and dry. Capillary Refill: Capillary refill takes 2 to 3 seconds. Findings: No bruising, erythema, lesion or rash. Comments: Hemosiderin staining both lower legs   Neurological:      Mental Status: She is alert and oriented to person, place, and time. Psychiatric:         Mood and Affect: Mood normal.         Behavior: Behavior normal.                             2021:  Impression   1. No signs of deep venous thrombosis in the bilateral lower extremities. 2.There is venous insufficiency of the right great saphenous vein starting at the saphenofemoral junction and extending continuously to the distal thigh. Duration is 3 seconds, diameter is 10 mm. 3.There is insufficiency of the left great saphenous vein at the level of the mid thigh, proximal calf, and distal calf. Duration is 6 seconds, diameter is 7 mm. 4.There is insufficiency of the left small saphenous vein at a short segment in the mid calf which is less than 1 second in duration. Diameter is 3 mm.           ASSESSMENT AND PLAN:  Chart, medications, lab work reviewed. BLE venous US duplex and insufficiency scans report reviewed personally by myself and discussed with patient. Diagnosis Orders   1. Edema of both lower extremities due to peripheral venous insufficiency  Elastic Bandages & Supports (MEDICAL COMPRESSION STOCKINGS) MISC   2.  Pain and swelling of lower leg, unspecified laterality  Elastic Bandages & Supports (MEDICAL COMPRESSION STOCKINGS) MISC   3. Leg fatigue  Elastic Bandages & Supports (MEDICAL COMPRESSION STOCKINGS) MISC   4. Heavy sensation of lower extremity  Elastic Bandages & Supports (MEDICAL COMPRESSION STOCKINGS) MISC   5. Varicose veins of leg with pain, bilateral  Elastic Bandages & Supports (MEDICAL COMPRESSION STOCKINGS) MISC   6. Postthrombotic syndrome of both legs without complications  Elastic Bandages & Supports (MEDICAL COMPRESSION STOCKINGS) MISC   7. Hemosiderin pigmentation of lower extremity due to varicose veins  Elastic Bandages & Supports (MEDICAL COMPRESSION STOCKINGS) MISC   8. H/O deep venous thrombosis  Elastic Bandages & Supports (MEDICAL COMPRESSION STOCKINGS) MISC   9. Peripheral vascular disease, unspecified ([facility] [address]   Elastic Bandages & Supports (MEDICAL COMPRESSION STOCKINGS) MISC   10. Severe obesity (BMI 35.0-35.9 with comorbidity) ([facility])  Elastic Bandages & Supports (MEDICAL COMPRESSION STOCKINGS) MISC          Plan:     No orders of the defined types were placed in this encounter. Orders Placed This Encounter   Medications    Elastic Bandages & Supports (MEDICAL COMPRESSION STOCKINGS) MISC     Si each by Does not apply route daily Thigh high 20-30 mmhg compression stockings both legs wear daily during day and off Qhs. Wear as tolerated. Do not wear if they cause increased pain. Dispense:  1 each     Refill:  5     --  Rx thigh knee high compression 20-30 mmhg take to [facility]. Wear daily during day and off Qhs. Wear for initial treatment of BLE venous insufficiency and painful BLE VV's. Wear as tolerated. Do not wear if they cause increased discomfort or lesions and notify vascular clinic. Educated in detail disease process of venous insufficiency and purpose of compression stockings. Wear for 10 week trial and return to evaluate effectiveness and management of LE symptoms.  If no relief, will discuss possible need for venous procedures. Patient is responsible for cost of compression socks if not covered by insurance. --  LLE SSV CVI: as the deficiency is of a trace amount, recommend daily wearing of knee-high class II 2030 compression socks during the day and off nightly. Wear as tolerated for management of insufficiency. Insufficiency at this time is too minimal to consider doing venous procedures. --  Elevate LE's when sitting and/or lying for management of LE edema. --  Follow up with general practitioner for other medical concerns and management.      Leo Stapleton, ALO - CNP

## 2022-01-18 ENCOUNTER — OFFICE VISIT (OUTPATIENT)
Dept: PRIMARY CARE CLINIC | Age: 67
End: 2022-01-18
Payer: MEDICARE

## 2022-01-18 VITALS
SYSTOLIC BLOOD PRESSURE: 132 MMHG | BODY MASS INDEX: 38.32 KG/M2 | HEART RATE: 92 BPM | DIASTOLIC BLOOD PRESSURE: 88 MMHG | WEIGHT: 230 LBS | OXYGEN SATURATION: 98 % | TEMPERATURE: 96.1 F | HEIGHT: 65 IN

## 2022-01-18 DIAGNOSIS — G62.9 NEUROPATHY: ICD-10-CM

## 2022-01-18 DIAGNOSIS — I82.403 RECURRENT DEEP VEIN THROMBOSIS (DVT) OF BOTH LOWER EXTREMITIES (HCC): ICD-10-CM

## 2022-01-18 DIAGNOSIS — I26.99 RECURRENT PULMONARY EMBOLI (HCC): ICD-10-CM

## 2022-01-18 LAB
INR BLD: 1.5
PROTHROMBIN TIME: 17.8 SEC (ref 12.3–14.9)

## 2022-01-18 PROCEDURE — 99213 OFFICE O/P EST LOW 20 MIN: CPT | Performed by: INTERNAL MEDICINE

## 2022-01-18 PROCEDURE — 3017F COLORECTAL CA SCREEN DOC REV: CPT | Performed by: INTERNAL MEDICINE

## 2022-01-18 PROCEDURE — 1036F TOBACCO NON-USER: CPT | Performed by: INTERNAL MEDICINE

## 2022-01-18 PROCEDURE — G8427 DOCREV CUR MEDS BY ELIG CLIN: HCPCS | Performed by: INTERNAL MEDICINE

## 2022-01-18 PROCEDURE — G8482 FLU IMMUNIZE ORDER/ADMIN: HCPCS | Performed by: INTERNAL MEDICINE

## 2022-01-18 PROCEDURE — G8417 CALC BMI ABV UP PARAM F/U: HCPCS | Performed by: INTERNAL MEDICINE

## 2022-01-18 RX ORDER — GABAPENTIN 300 MG/1
300 CAPSULE ORAL 3 TIMES DAILY
Qty: 90 CAPSULE | Refills: 2 | Status: SHIPPED | OUTPATIENT
Start: 2022-01-18 | End: 2022-04-19 | Stop reason: SDUPTHER

## 2022-01-18 ASSESSMENT — ENCOUNTER SYMPTOMS
ABDOMINAL DISTENTION: 0
FACIAL SWELLING: 0
BACK PAIN: 1
PHOTOPHOBIA: 0
APNEA: 0
CHOKING: 0
BLOOD IN STOOL: 0

## 2022-01-18 NOTE — PROGRESS NOTES
Chela Witt 61 y.o. female presents today with   Chief Complaint   Patient presents with    Peripheral Neuropathy     3 month f/u    Leg Pain    Medication Refill       Leg Pain   Incident onset: years. The incident occurred at home. Injury mechanism: neuropathy. The pain is present in the left leg and right leg. The pain is at a severity of 7/10. The pain is severe. The pain has been worsening since onset. Pertinent negatives include no inability to bear weight or loss of motion.        Past Medical History:   Diagnosis Date    Chronic pain of right ankle 2016    twisting fall, arthritis    DVT of deep femoral vein (HCC)     x 2, after surgery knee, other spontanous     Hiatal hernia     History of radiofrequency ablation (RFA) procedure for cardiac arrhythmia     SVT    Hx of blood clots     Hypertension     Leg pain, right     ulcers    Low back pain radiating to right leg     Neuropathy, leg     Osteoarthritis     Peptic ulcer     cause of anemia    Pulmonary emboli ([address] 2001    x 2, right leg, left leg    Venous ulcer of right leg (HCC)     chronic pain     Patient Active Problem List    Diagnosis Date Noted    Edema of both lower extremities due to peripheral venous insufficiency 2021    Varicose veins of leg with pain, bilateral 2021    Recurrent pulmonary emboli ([address] 2021     Past Surgical History:   Procedure Laterality Date     SECTION      JOINT REPLACEMENT Bilateral      Family History   Problem Relation Age of Onset    Stroke Mother     Breast Cancer Mother     High Blood Pressure Mother     Stroke Father     High Blood Pressure Father      Social History     Socioeconomic History    Marital status:      Spouse name: None    Number of children: None    Years of education: None    Highest education level: None   Occupational History    None   Tobacco Use    Smoking status: Never Smoker    Smokeless tobacco: Never Used   Vaping Use    Vaping Use: Never used   Substance and Sexual Activity    Alcohol use: No    Drug use: No    Sexual activity: None   Other Topics Concern    None   Social History Narrative    None     Social Determinants of Health     Financial Resource Strain: Low Risk     Difficulty of Paying Living Expenses: Not very hard   Food Insecurity: No Food Insecurity    Worried About Running Out of Food in the Last Year: Never true    Kerri of Food in the Last Year: Never true   Transportation Needs: No Transportation Needs    Lack of Transportation (Medical): No    Lack of Transportation (Non-Medical): No   Physical Activity:     Days of Exercise per Week: Not on file    Minutes of Exercise per Session: Not on file   Stress:     Feeling of Stress : Not on file   Social Connections:     Frequency of Communication with Friends and Family: Not on file    Frequency of Social Gatherings with Friends and Family: Not on file    Attends Latter-day Services: Not on file    Active Member of [address] [place] [address] or Organizations: Not on file    Attends Club or Organization Meetings: Not on file    Marital Status: Not on file   Intimate Partner Violence:     Fear of Current or Ex-Partner: Not on file    Emotionally Abused: Not on file    Physically Abused: Not on file    Sexually Abused: Not on file   Housing Stability:     Unable to Pay for Housing in the Last Year: Not on file    Number of [place] in the Last Year: Not on file    Unstable Housing in the Last Year: Not on file     No Known Allergies    Review of Systems   Constitutional: Negative for chills and fever. HENT: Negative for facial swelling and nosebleeds. Eyes: Negative for photophobia and visual disturbance. Respiratory: Negative for apnea and choking. Cardiovascular: Positive for leg swelling. Negative for chest pain and palpitations. Gastrointestinal: Negative for abdominal distention and blood in stool.    Genitourinary: Negative for enuresis and hematuria. Musculoskeletal: Positive for arthralgias, back pain, gait problem, joint swelling and myalgias. Skin: Negative for rash. Neurological: Negative for syncope and speech difficulty. Hematological: Does not bruise/bleed easily. Psychiatric/Behavioral: Negative for hallucinations and suicidal ideas. Vitals:    01/18/22 1000   BP: 132/88   Site: Left Upper Arm   Cuff Size: Large Adult   Pulse: 92   Temp: 96.1 °F (35.6 °C)   SpO2: 98%   Weight: 230 lb (104.3 kg)   Height: 5' 5\" (1.651 m)       Physical Exam  Constitutional:       Appearance: She is well-developed. HENT:      Head: Normocephalic and atraumatic. Eyes:      Pupils: Pupils are equal, round, and reactive to light. Cardiovascular:      Rate and Rhythm: Normal rate and regular rhythm. Heart sounds: Normal heart sounds. Pulmonary:      Effort: No respiratory distress. Breath sounds: Normal breath sounds. No wheezing. Abdominal:      General: Bowel sounds are normal. There is no distension. Musculoskeletal:         General: Normal range of motion. Cervical back: Normal range of motion. Skin:     Coloration: Skin is not jaundiced. Neurological:      Mental Status: She is alert and oriented to person, place, and time. Cranial Nerves: No cranial nerve deficit. Psychiatric:         Mood and Affect: Mood normal.        Assessment/Plan  Lizeth Hughes was seen today for peripheral neuropathy, leg pain and medication refill. Diagnoses and all orders for this visit:    Neuropathy  -     gabapentin (NEURONTIN) 300 MG capsule; Take 1 capsule by mouth 3 times daily for 90 days. Recurrent pulmonary emboli (HCC)    stable   using thigh high stocking for varicose  Veins. Return in about 3 months (around 4/18/2022), or if symptoms worsen or fail to improve.     Janeen Gold MD

## 2022-03-09 ENCOUNTER — TELEPHONE (OUTPATIENT)
Dept: PRIMARY CARE CLINIC | Age: 67
End: 2022-03-09

## 2022-03-09 DIAGNOSIS — I82.403 RECURRENT DEEP VEIN THROMBOSIS (DVT) OF BOTH LOWER EXTREMITIES (HCC): Primary | ICD-10-CM

## 2022-03-09 NOTE — TELEPHONE ENCOUNTER
Patient went to get bloodwork done and said her order has  needs you to renew the orders for her bloodwork.

## 2022-03-11 DIAGNOSIS — I82.403 RECURRENT DEEP VEIN THROMBOSIS (DVT) OF BOTH LOWER EXTREMITIES (HCC): ICD-10-CM

## 2022-03-11 LAB
INR BLD: 1.7
PROTHROMBIN TIME: 19.4 SEC (ref 12.3–14.9)

## 2022-03-30 DIAGNOSIS — I10 ESSENTIAL HYPERTENSION: ICD-10-CM

## 2022-03-30 DIAGNOSIS — I82.503 CHRONIC DEEP VEIN THROMBOSIS (DVT) OF BOTH LOWER EXTREMITIES, UNSPECIFIED VEIN (HCC): ICD-10-CM

## 2022-03-30 RX ORDER — WARFARIN SODIUM 10 MG/1
TABLET ORAL
Qty: 90 TABLET | Refills: 0 | Status: SHIPPED | OUTPATIENT
Start: 2022-03-30 | End: 2022-06-30

## 2022-03-30 RX ORDER — METOPROLOL SUCCINATE 50 MG/1
TABLET, EXTENDED RELEASE ORAL
Qty: 90 TABLET | Refills: 2 | Status: SHIPPED | OUTPATIENT
Start: 2022-03-30

## 2022-04-06 SDOH — HEALTH STABILITY: PHYSICAL HEALTH: ON AVERAGE, HOW MANY MINUTES DO YOU ENGAGE IN EXERCISE AT THIS LEVEL?: 20 MIN

## 2022-04-06 SDOH — HEALTH STABILITY: PHYSICAL HEALTH: ON AVERAGE, HOW MANY DAYS PER WEEK DO YOU ENGAGE IN MODERATE TO STRENUOUS EXERCISE (LIKE A BRISK WALK)?: 1 DAY

## 2022-04-06 ASSESSMENT — PATIENT HEALTH QUESTIONNAIRE - PHQ9
2. FEELING DOWN, DEPRESSED OR HOPELESS: 1
SUM OF ALL RESPONSES TO PHQ QUESTIONS 1-9: 2
SUM OF ALL RESPONSES TO PHQ9 QUESTIONS 1 & 2: 2
1. LITTLE INTEREST OR PLEASURE IN DOING THINGS: 1
SUM OF ALL RESPONSES TO PHQ QUESTIONS 1-9: 2

## 2022-04-06 ASSESSMENT — LIFESTYLE VARIABLES
HOW OFTEN DO YOU HAVE A DRINK CONTAINING ALCOHOL: 1
HOW OFTEN DO YOU HAVE A DRINK CONTAINING ALCOHOL: NEVER

## 2022-04-19 ENCOUNTER — OFFICE VISIT (OUTPATIENT)
Dept: PRIMARY CARE CLINIC | Age: 67
End: 2022-04-19
Payer: MEDICARE

## 2022-04-19 VITALS — HEIGHT: 65 IN | WEIGHT: 240 LBS | BODY MASS INDEX: 39.99 KG/M2

## 2022-04-19 VITALS
OXYGEN SATURATION: 95 % | SYSTOLIC BLOOD PRESSURE: 140 MMHG | DIASTOLIC BLOOD PRESSURE: 82 MMHG | WEIGHT: 240 LBS | TEMPERATURE: 97.8 F | BODY MASS INDEX: 39.99 KG/M2 | HEIGHT: 65 IN | HEART RATE: 82 BPM

## 2022-04-19 DIAGNOSIS — E78.00 PURE HYPERCHOLESTEROLEMIA: ICD-10-CM

## 2022-04-19 DIAGNOSIS — I26.99 PULMONARY EMBOLISM, OTHER, UNSPECIFIED CHRONICITY, UNSPECIFIED WHETHER ACUTE COR PULMONALE PRESENT (HCC): ICD-10-CM

## 2022-04-19 DIAGNOSIS — E78.00 PURE HYPERCHOLESTEROLEMIA: Primary | ICD-10-CM

## 2022-04-19 DIAGNOSIS — D50.9 IRON DEFICIENCY ANEMIA, UNSPECIFIED IRON DEFICIENCY ANEMIA TYPE: ICD-10-CM

## 2022-04-19 DIAGNOSIS — M81.0 AGE RELATED OSTEOPOROSIS, UNSPECIFIED PATHOLOGICAL FRACTURE PRESENCE: ICD-10-CM

## 2022-04-19 DIAGNOSIS — Z00.00 PREVENTATIVE HEALTH CARE: ICD-10-CM

## 2022-04-19 DIAGNOSIS — I10 ESSENTIAL HYPERTENSION: ICD-10-CM

## 2022-04-19 DIAGNOSIS — G62.9 NEUROPATHY: ICD-10-CM

## 2022-04-19 DIAGNOSIS — Z23 IMMUNIZATION DUE: ICD-10-CM

## 2022-04-19 DIAGNOSIS — Z12.31 ENCOUNTER FOR SCREENING MAMMOGRAM FOR MALIGNANT NEOPLASM OF BREAST: ICD-10-CM

## 2022-04-19 DIAGNOSIS — R73.9 HYPERGLYCEMIA: ICD-10-CM

## 2022-04-19 DIAGNOSIS — Z12.11 COLON CANCER SCREENING: ICD-10-CM

## 2022-04-19 DIAGNOSIS — E66.01 SEVERE OBESITY (BMI 35.0-39.9) WITH COMORBIDITY (HCC): ICD-10-CM

## 2022-04-19 DIAGNOSIS — E03.9 HYPOTHYROIDISM, UNSPECIFIED TYPE: ICD-10-CM

## 2022-04-19 LAB
ALBUMIN SERPL-MCNC: 4.7 G/DL (ref 3.5–4.6)
ALP BLD-CCNC: 53 U/L (ref 40–130)
ALT SERPL-CCNC: 12 U/L (ref 0–33)
ANION GAP SERPL CALCULATED.3IONS-SCNC: 16 MEQ/L (ref 9–15)
AST SERPL-CCNC: 18 U/L (ref 0–35)
BASOPHILS ABSOLUTE: 0 K/UL (ref 0–0.2)
BASOPHILS RELATIVE PERCENT: 0.6 %
BILIRUB SERPL-MCNC: 0.4 MG/DL (ref 0.2–0.7)
BUN BLDV-MCNC: 21 MG/DL (ref 8–23)
CALCIUM SERPL-MCNC: 10.3 MG/DL (ref 8.5–9.9)
CHLORIDE BLD-SCNC: 105 MEQ/L (ref 95–107)
CHOLESTEROL, FASTING: 195 MG/DL (ref 0–199)
CO2: 22 MEQ/L (ref 20–31)
CREAT SERPL-MCNC: 1.01 MG/DL (ref 0.5–0.9)
EOSINOPHILS ABSOLUTE: 0.2 K/UL (ref 0–0.7)
EOSINOPHILS RELATIVE PERCENT: 4.1 %
GFR AFRICAN AMERICAN: >60
GFR NON-AFRICAN AMERICAN: 55
GLOBULIN: 2.8 G/DL (ref 2.3–3.5)
GLUCOSE BLD-MCNC: 97 MG/DL (ref 70–99)
HBA1C MFR BLD: 5.6 % (ref 4.8–5.9)
HCT VFR BLD CALC: 36 % (ref 37–47)
HDLC SERPL-MCNC: 51 MG/DL (ref 40–59)
HEMOGLOBIN: 12.4 G/DL (ref 12–16)
INR BLD: 1.8
LDL CHOLESTEROL CALCULATED: 121 MG/DL (ref 0–129)
LYMPHOCYTES ABSOLUTE: 1.3 K/UL (ref 1–4.8)
LYMPHOCYTES RELATIVE PERCENT: 27.1 %
MCH RBC QN AUTO: 32.8 PG (ref 27–31.3)
MCHC RBC AUTO-ENTMCNC: 34.3 % (ref 33–37)
MCV RBC AUTO: 95.6 FL (ref 82–100)
MONOCYTES ABSOLUTE: 0.6 K/UL (ref 0.2–0.8)
MONOCYTES RELATIVE PERCENT: 13.6 %
NEUTROPHILS ABSOLUTE: 2.5 K/UL (ref 1.4–6.5)
NEUTROPHILS RELATIVE PERCENT: 54.6 %
PDW BLD-RTO: 13.2 % (ref 11.5–14.5)
PLATELET # BLD: 226 K/UL (ref 130–400)
POTASSIUM SERPL-SCNC: 5.3 MEQ/L (ref 3.4–4.9)
PROTHROMBIN TIME: 20.2 SEC (ref 12.3–14.9)
RBC # BLD: 3.77 M/UL (ref 4.2–5.4)
SODIUM BLD-SCNC: 143 MEQ/L (ref 135–144)
TOTAL PROTEIN: 7.5 G/DL (ref 6.3–8)
TRIGLYCERIDE, FASTING: 115 MG/DL (ref 0–150)
TSH REFLEX: 3.15 UIU/ML (ref 0.44–3.86)
WBC # BLD: 4.6 K/UL (ref 4.8–10.8)

## 2022-04-19 PROCEDURE — 1123F ACP DISCUSS/DSCN MKR DOCD: CPT | Performed by: INTERNAL MEDICINE

## 2022-04-19 PROCEDURE — 90732 PPSV23 VACC 2 YRS+ SUBQ/IM: CPT | Performed by: INTERNAL MEDICINE

## 2022-04-19 PROCEDURE — 99213 OFFICE O/P EST LOW 20 MIN: CPT | Performed by: INTERNAL MEDICINE

## 2022-04-19 PROCEDURE — G0009 ADMIN PNEUMOCOCCAL VACCINE: HCPCS | Performed by: INTERNAL MEDICINE

## 2022-04-19 PROCEDURE — 1036F TOBACCO NON-USER: CPT | Performed by: INTERNAL MEDICINE

## 2022-04-19 PROCEDURE — G8400 PT W/DXA NO RESULTS DOC: HCPCS | Performed by: INTERNAL MEDICINE

## 2022-04-19 PROCEDURE — 4040F PNEUMOC VAC/ADMIN/RCVD: CPT | Performed by: INTERNAL MEDICINE

## 2022-04-19 PROCEDURE — G8427 DOCREV CUR MEDS BY ELIG CLIN: HCPCS | Performed by: INTERNAL MEDICINE

## 2022-04-19 PROCEDURE — G8417 CALC BMI ABV UP PARAM F/U: HCPCS | Performed by: INTERNAL MEDICINE

## 2022-04-19 PROCEDURE — 1090F PRES/ABSN URINE INCON ASSESS: CPT | Performed by: INTERNAL MEDICINE

## 2022-04-19 PROCEDURE — 3017F COLORECTAL CA SCREEN DOC REV: CPT | Performed by: INTERNAL MEDICINE

## 2022-04-19 RX ORDER — GABAPENTIN 300 MG/1
300 CAPSULE ORAL 3 TIMES DAILY
Qty: 90 CAPSULE | Refills: 2 | Status: SHIPPED | OUTPATIENT
Start: 2022-04-19 | End: 2022-07-26

## 2022-04-19 ASSESSMENT — PATIENT HEALTH QUESTIONNAIRE - PHQ9
SUM OF ALL RESPONSES TO PHQ QUESTIONS 1-9: 0
1. LITTLE INTEREST OR PLEASURE IN DOING THINGS: 0
SUM OF ALL RESPONSES TO PHQ QUESTIONS 1-9: 0
SUM OF ALL RESPONSES TO PHQ QUESTIONS 1-9: 0
1. LITTLE INTEREST OR PLEASURE IN DOING THINGS: 0
SUM OF ALL RESPONSES TO PHQ QUESTIONS 1-9: 0
2. FEELING DOWN, DEPRESSED OR HOPELESS: 0
SUM OF ALL RESPONSES TO PHQ QUESTIONS 1-9: 0
SUM OF ALL RESPONSES TO PHQ9 QUESTIONS 1 & 2: 0

## 2022-04-19 NOTE — PROGRESS NOTES
Hortencia Gunter 74 y.o. female presents today with No chief complaint on file. Hyperlipidemia  This is a chronic problem. The current episode started more than 1 year ago. The problem is controlled. Recent lipid tests were reviewed and are normal. Pertinent negatives include no chest pain. Current antihyperlipidemic treatment includes statins. The current treatment provides moderate improvement of lipids.     check osteoporosis  Check immunization  Check inr from PE    Past Medical History:   Diagnosis Date    Chronic pain of right ankle 2016    twisting fall, arthritis    DVT of deep femoral vein (HCC)     x 2, after surgery knee, other spontanous     Hiatal hernia     History of radiofrequency ablation (RFA) procedure for cardiac arrhythmia     SVT    Hx of blood clots     Hypertension     Leg pain, right     ulcers    Low back pain radiating to right leg     Neuropathy, leg     Osteoarthritis     Peptic ulcer     cause of anemia    Pulmonary emboli ([address] 2001    x 2, right leg, left leg    Venous ulcer of right leg (HCC)     chronic pain     Patient Active Problem List    Diagnosis Date Noted    Edema of both lower extremities due to peripheral venous insufficiency 2021    Varicose veins of leg with pain, bilateral 2021    Recurrent pulmonary emboli ([address] 2021     Past Surgical History:   Procedure Laterality Date     SECTION      JOINT REPLACEMENT Bilateral      Family History   Problem Relation Age of Onset    Stroke Mother     Breast Cancer Mother     High Blood Pressure Mother     Stroke Father     High Blood Pressure Father      Social History     Socioeconomic History    Marital status:      Spouse name: None    Number of children: None    Years of education: None    Highest education level: None   Occupational History    None   Tobacco Use    Smoking status: Never Smoker    Smokeless tobacco: Never Used   Vaping Use    Vaping Use: Never used   Substance and Sexual Activity    Alcohol use: No    Drug use: No    Sexual activity: None   Other Topics Concern    None   Social History Narrative    None     Social Determinants of Health     Financial Resource Strain: Low Risk     Difficulty of Paying Living Expenses: Not very hard   Food Insecurity: No Food Insecurity    Worried About Running Out of Food in the Last Year: Never true    Kerri of Food in the Last Year: Never true   Transportation Needs: No Transportation Needs    Lack of Transportation (Medical): No    Lack of Transportation (Non-Medical): No   Physical Activity: Insufficiently Active    Days of Exercise per Week: 1 day    Minutes of Exercise per Session: 20 min   Stress:     Feeling of Stress : Not on file   Social Connections:     Frequency of Communication with Friends and Family: Not on file    Frequency of Social Gatherings with Friends and Family: Not on file    Attends Buddhist Services: Not on file    Active Member of Clubs or Organizations: Not on file    Attends Club or Organization Meetings: Not on file    Marital Status: Not on file   Intimate Partner Violence:     Fear of Current or Ex-Partner: Not on file    Emotionally Abused: Not on file    Physically Abused: Not on file    Sexually Abused: Not on file   Housing Stability:     Unable to Pay for Housing in the Last Year: Not on file    Number of [place] in the Last Year: Not on file    Unstable Housing in the Last Year: Not on file     No Known Allergies    Review of Systems   Constitutional: Negative for chills and fever. HENT: Negative for facial swelling and nosebleeds. Eyes: Negative for photophobia and visual disturbance. Respiratory: Negative for apnea and choking. Cardiovascular: Negative for chest pain and palpitations. Gastrointestinal: Negative for abdominal distention and blood in stool. Genitourinary: Negative for enuresis, hematuria and vaginal bleeding. Musculoskeletal: Negative for gait problem and joint swelling. Skin: Negative for rash. Neurological: Negative for syncope and speech difficulty. Hematological: Does not bruise/bleed easily. Psychiatric/Behavioral: Negative for hallucinations and suicidal ideas. Vitals:    04/19/22 0904   Weight: 240 lb (108.9 kg)   Height: 5' 5\" (1.651 m)       Physical Exam  Constitutional:       Appearance: She is well-developed. HENT:      Head: Normocephalic and atraumatic. Eyes:      Pupils: Pupils are equal, round, and reactive to light. Cardiovascular:      Rate and Rhythm: Normal rate and regular rhythm. Heart sounds: Normal heart sounds. Pulmonary:      Effort: No respiratory distress. Breath sounds: Normal breath sounds. No wheezing. Abdominal:      General: Bowel sounds are normal.      Palpations: Abdomen is soft. Musculoskeletal:         General: Normal range of motion. Cervical back: Normal range of motion. Neurological:      Mental Status: She is alert and oriented to person, place, and time. Assessment/Plan  Diagnoses and all orders for this visit:    Pure hypercholesterolemia  -     Lipid, Fasting; Future  -     Comprehensive Metabolic Panel; Future    Essential hypertension    Pulmonary embolism, other, unspecified chronicity, unspecified whether acute cor pulmonale present ([facility]  -     Protime-INR; Future    Hypothyroidism, unspecified type  -     TSH with Reflex; Future    Encounter for screening mammogram for malignant neoplasm of breast    Iron deficiency anemia, unspecified iron deficiency anemia type    Immunization due  -     PNEUMOVAX 23 subcutaneous/IM (Pneumococcal polysaccharide vaccine 23-valent >= 3yo)    Preventative health care  -     Lipid, Fasting;  Future  -     CBC with Auto Differential; Future  -     Hemoglobin A1C; Future  -     DEXA BONE DENSITY AXIAL SKELETON; Future  -     PNEUMOVAX 23 subcutaneous/IM (Pneumococcal polysaccharide vaccine 23-valent >= 3yo)    Hyperglycemia  -     Hemoglobin A1C; Future    Age related osteoporosis, unspecified pathological fracture presence  -     DEXA BONE DENSITY AXIAL SKELETON; Future    Colon cancer screening  -     Fecal DNA Colorectal cancer screening ([facility])    Severe obesity (BMI 35.0-39. 9) with comorbidity ([address]        No follow-ups on file.     Troy Higgins MD

## 2022-04-19 NOTE — PROGRESS NOTES
Ananda Galarza 74 y.o. female presents today with   Chief Complaint   Patient presents with    3 Month Follow-Up    Peripheral Neuropathy    Leg Pain    Medication Refill       Leg Pain   Incident onset: years. The incident occurred at home. Injury mechanism: neuropathy. The pain is present in the left leg and right leg. The pain is mild. Associated symptoms include numbness.        Past Medical History:   Diagnosis Date    Chronic pain of right ankle 2016    twisting fall, arthritis    DVT of deep femoral vein (HCC)     x 2, after surgery knee, other spontanous     Hiatal hernia     History of radiofrequency ablation (RFA) procedure for cardiac arrhythmia     SVT    Hx of blood clots     Hypertension     Leg pain, right     ulcers    Low back pain radiating to right leg     Neuropathy, leg     Osteoarthritis     Peptic ulcer 2011    cause of anemia    Pulmonary emboli ([address] 2001    x 2, right leg, left leg    Venous ulcer of right leg (HCC)     chronic pain     Patient Active Problem List    Diagnosis Date Noted    Edema of both lower extremities due to peripheral venous insufficiency 2021    Varicose veins of leg with pain, bilateral 2021    Recurrent pulmonary emboli ([address] 2021     Past Surgical History:   Procedure Laterality Date     SECTION      JOINT REPLACEMENT Bilateral      Family History   Problem Relation Age of Onset    Stroke Mother     Breast Cancer Mother     High Blood Pressure Mother     Stroke Father     High Blood Pressure Father      Social History     Socioeconomic History    Marital status:      Spouse name: None    Number of children: None    Years of education: None    Highest education level: None   Occupational History    None   Tobacco Use    Smoking status: Never Smoker    Smokeless tobacco: Never Used   Vaping Use    Vaping Use: Never used   Substance and Sexual Activity    Alcohol use: No    Drug use: No    Sexual activity: None   Other Topics Concern    None   Social History Narrative    None     Social Determinants of Health     Financial Resource Strain: Low Risk     Difficulty of Paying Living Expenses: Not very hard   Food Insecurity: No Food Insecurity    Worried About Running Out of Food in the Last Year: Never true    Kerri of Food in the Last Year: Never true   Transportation Needs: No Transportation Needs    Lack of Transportation (Medical): No    Lack of Transportation (Non-Medical): No   Physical Activity: Insufficiently Active    Days of Exercise per Week: 1 day    Minutes of Exercise per Session: 20 min   Stress:     Feeling of Stress : Not on file   Social Connections:     Frequency of Communication with Friends and Family: Not on file    Frequency of Social Gatherings with Friends and Family: Not on file    Attends Alevism Services: Not on file    Active Member of Clubs or Organizations: Not on file    Attends Club or Organization Meetings: Not on file    Marital Status: Not on file   Intimate Partner Violence:     Fear of Current or Ex-Partner: Not on file    Emotionally Abused: Not on file    Physically Abused: Not on file    Sexually Abused: Not on file   Housing Stability:     Unable to Pay for Housing in the Last Year: Not on file    Number of [place] in the Last Year: Not on file    Unstable Housing in the Last Year: Not on file     No Known Allergies    Review of Systems   Constitutional: Negative for fever. HENT: Negative for facial swelling and nosebleeds. Eyes: Negative for visual disturbance. Respiratory: Negative for apnea and choking. Cardiovascular: Negative for chest pain and palpitations. Gastrointestinal: Negative for abdominal distention and blood in stool. Genitourinary: Negative for enuresis. Musculoskeletal: Negative for gait problem and joint swelling. Skin: Negative for rash. Neurological: Positive for numbness.  Negative for syncope and speech difficulty. Psychiatric/Behavioral: Negative for hallucinations and suicidal ideas. Vitals:    04/19/22 0855 04/19/22 0858   BP: (!) 142/84 (!) 140/82   Site: Left Upper Arm Right Upper Arm   Cuff Size: Large Adult Large Adult   Pulse: 82    Temp: 97.8 °F (36.6 °C)    SpO2: 95%    Weight: 240 lb (108.9 kg)    Height: 5' 5\" (1.651 m)        Physical Exam  Constitutional:       Appearance: She is well-developed. HENT:      Head: Normocephalic and atraumatic. Eyes:      Pupils: Pupils are equal, round, and reactive to light. Cardiovascular:      Rate and Rhythm: Normal rate and regular rhythm. Heart sounds: Normal heart sounds. Pulmonary:      Effort: No respiratory distress. Breath sounds: Normal breath sounds. No wheezing. Abdominal:      General: Bowel sounds are normal.      Palpations: Abdomen is soft. Musculoskeletal:         General: Normal range of motion. Cervical back: Normal range of motion. Neurological:      Mental Status: She is alert and oriented to person, place, and time. Assessment/Plan  Hal Romberg was seen today for 3 month follow-up, peripheral neuropathy, leg pain and medication refill. Diagnoses and all orders for this visit:    Neuropathy  -     gabapentin (NEURONTIN) 300 MG capsule; Take 1 capsule by mouth 3 times daily for 90 days. Controlled Substances Monitoring: Periodic Controlled Substance Monitoring: Possible medication side effects, risk of tolerance/dependence & alternative treatments discussed. ,No signs of potential drug abuse or diversion identified. ,Assessed functional status. Maryjo Bell MD)        Return in about 3 months (around 7/19/2022), or if symptoms worsen or fail to improve.     Maryjo Bell MD

## 2022-04-28 ASSESSMENT — ENCOUNTER SYMPTOMS
FACIAL SWELLING: 0
ABDOMINAL DISTENTION: 0
APNEA: 0
FACIAL SWELLING: 0
CHOKING: 0
CHOKING: 0
APNEA: 0
BLOOD IN STOOL: 0
BLOOD IN STOOL: 0
ABDOMINAL DISTENTION: 0
PHOTOPHOBIA: 0

## 2022-05-02 ENCOUNTER — HOSPITAL ENCOUNTER (OUTPATIENT)
Dept: WOMENS IMAGING | Age: 67
Discharge: HOME | End: 2022-05-04
Payer: MEDICARE

## 2022-05-02 DIAGNOSIS — M81.0 AGE RELATED OSTEOPOROSIS, UNSPECIFIED PATHOLOGICAL FRACTURE PRESENCE: ICD-10-CM

## 2022-05-02 DIAGNOSIS — Z00.00 PREVENTATIVE HEALTH CARE: ICD-10-CM

## 2022-05-02 PROCEDURE — 77080 DXA BONE DENSITY AXIAL: CPT

## 2022-05-23 DIAGNOSIS — I82.403 RECURRENT DEEP VEIN THROMBOSIS (DVT) OF BOTH LOWER EXTREMITIES (HCC): ICD-10-CM

## 2022-05-23 LAB
INR BLD: 1.4
PROTHROMBIN TIME: 17.4 SEC (ref 12.3–14.9)

## 2022-05-25 DIAGNOSIS — I82.403 RECURRENT DEEP VEIN THROMBOSIS (DVT) OF BOTH LOWER EXTREMITIES (HCC): Primary | ICD-10-CM

## 2022-05-27 ENCOUNTER — TELEPHONE (OUTPATIENT)
Dept: GASTROENTEROLOGY | Age: 67
End: 2022-05-27

## 2022-05-30 DIAGNOSIS — R60.0 LOCALIZED EDEMA: ICD-10-CM

## 2022-05-31 RX ORDER — SPIRONOLACTONE 25 MG/1
TABLET ORAL
Qty: 90 TABLET | Refills: 1 | Status: SHIPPED | OUTPATIENT
Start: 2022-05-31

## 2022-06-15 DIAGNOSIS — E78.00 PURE HYPERCHOLESTEROLEMIA: ICD-10-CM

## 2022-06-15 RX ORDER — ATORVASTATIN CALCIUM 10 MG/1
TABLET, FILM COATED ORAL
Qty: 90 TABLET | Refills: 3 | Status: SHIPPED | OUTPATIENT
Start: 2022-06-15

## 2022-06-17 DIAGNOSIS — I82.403 RECURRENT DEEP VEIN THROMBOSIS (DVT) OF BOTH LOWER EXTREMITIES (HCC): ICD-10-CM

## 2022-06-17 LAB
INR BLD: 2.2
PROTHROMBIN TIME: 24.3 SEC (ref 12.3–14.9)

## 2022-06-23 ENCOUNTER — COMMUNITY OUTREACH (OUTPATIENT)
Dept: PRIMARY CARE CLINIC | Age: 67
End: 2022-06-23

## 2022-06-23 DIAGNOSIS — K27.9 PEPTIC ULCER: ICD-10-CM

## 2022-06-23 RX ORDER — OMEPRAZOLE 40 MG/1
CAPSULE, DELAYED RELEASE ORAL
Qty: 90 CAPSULE | Refills: 11 | Status: SHIPPED | OUTPATIENT
Start: 2022-06-23

## 2022-06-23 NOTE — PROGRESS NOTES
Patient's HM shows they are overdue for Annual Wellness Visit, Mammogram, Colorectal Screening and Cervical Cancer Screening. [email or web address] and  files searched without success. No results to attach to order nor HM updated.   Upcoming apt 7/19/22  Colonoscopy ordered 4/19/22

## 2022-06-30 DIAGNOSIS — I82.503 CHRONIC DEEP VEIN THROMBOSIS (DVT) OF BOTH LOWER EXTREMITIES, UNSPECIFIED VEIN (HCC): ICD-10-CM

## 2022-06-30 RX ORDER — WARFARIN SODIUM 10 MG/1
TABLET ORAL
Qty: 90 TABLET | Refills: 0 | Status: SHIPPED | OUTPATIENT
Start: 2022-06-30 | End: 2022-09-21

## 2022-07-19 ENCOUNTER — TELEPHONE (OUTPATIENT)
Dept: GASTROENTEROLOGY | Age: 67
End: 2022-07-19

## 2022-07-26 DIAGNOSIS — G62.9 NEUROPATHY: ICD-10-CM

## 2022-07-26 RX ORDER — GABAPENTIN 300 MG/1
300 CAPSULE ORAL 3 TIMES DAILY
Qty: 90 CAPSULE | Refills: 2 | Status: SHIPPED | OUTPATIENT
Start: 2022-07-26 | End: 2022-10-24

## 2022-09-09 ENCOUNTER — TELEPHONE (OUTPATIENT)
Dept: PRIMARY CARE CLINIC | Age: 67
End: 2022-09-09

## 2022-09-09 NOTE — TELEPHONE ENCOUNTER
Called pt and she reports she has moved to [place] and she has new Dr and I advisede her we will try to switch her name of Dr in [address] today. Pt reports already filling out all paperwork for her transition of switching Dr's. Pt declines AWV today.

## 2022-09-21 DIAGNOSIS — I82.503 CHRONIC DEEP VEIN THROMBOSIS (DVT) OF BOTH LOWER EXTREMITIES, UNSPECIFIED VEIN (HCC): ICD-10-CM

## 2022-09-21 RX ORDER — WARFARIN SODIUM 10 MG/1
TABLET ORAL
Qty: 90 TABLET | Refills: 0 | Status: SHIPPED | OUTPATIENT
Start: 2022-09-21

## 2022-11-29 ENCOUNTER — TELEPHONE (OUTPATIENT)
Dept: GASTROENTEROLOGY | Age: 67
End: 2022-11-29

## 2022-11-29 ENCOUNTER — TELEPHONE (OUTPATIENT)
Dept: PRIMARY CARE CLINIC | Age: 67
End: 2022-11-29

## 2022-11-29 DIAGNOSIS — Z12.31 ENCOUNTER FOR SCREENING MAMMOGRAM FOR MALIGNANT NEOPLASM OF BREAST: Primary | ICD-10-CM
